# Patient Record
Sex: MALE | Race: WHITE | NOT HISPANIC OR LATINO | Employment: FULL TIME | URBAN - METROPOLITAN AREA
[De-identification: names, ages, dates, MRNs, and addresses within clinical notes are randomized per-mention and may not be internally consistent; named-entity substitution may affect disease eponyms.]

---

## 2017-02-14 ENCOUNTER — HISTORICAL (OUTPATIENT)
Dept: SURGERY | Facility: HOSPITAL | Age: 52
End: 2017-02-14

## 2018-09-10 ENCOUNTER — HISTORICAL (OUTPATIENT)
Dept: ADMINISTRATIVE | Facility: HOSPITAL | Age: 53
End: 2018-09-10

## 2018-11-21 ENCOUNTER — HISTORICAL (OUTPATIENT)
Dept: LAB | Facility: HOSPITAL | Age: 53
End: 2018-11-21

## 2018-11-21 ENCOUNTER — HISTORICAL (OUTPATIENT)
Dept: PREADMISSION TESTING | Facility: HOSPITAL | Age: 53
End: 2018-11-21

## 2018-11-21 LAB
BUN SERPL-MCNC: 15 MG/DL (ref 7–18)
CALCIUM SERPL-MCNC: 9 MG/DL (ref 8.5–10.1)
CHLORIDE SERPL-SCNC: 101 MMOL/L (ref 98–107)
CO2 SERPL-SCNC: 26 MMOL/L (ref 21–32)
CREAT SERPL-MCNC: 0.99 MG/DL (ref 0.7–1.3)
CREAT/UREA NIT SERPL: 15.2
GLUCOSE SERPL-MCNC: 84 MG/DL (ref 74–106)
POTASSIUM SERPL-SCNC: 4.2 MMOL/L (ref 3.5–5.1)
SODIUM SERPL-SCNC: 138 MMOL/L (ref 136–145)

## 2018-11-27 ENCOUNTER — HISTORICAL (OUTPATIENT)
Dept: SURGERY | Facility: HOSPITAL | Age: 53
End: 2018-11-27

## 2020-09-14 ENCOUNTER — HISTORICAL (OUTPATIENT)
Dept: ADMINISTRATIVE | Facility: HOSPITAL | Age: 55
End: 2020-09-14

## 2020-11-30 LAB
BUN SERPL-MCNC: 12.1 MG/DL (ref 8.4–25.7)
CALCIUM SERPL-MCNC: 9.5 MG/DL (ref 8.4–10.2)
CHLORIDE SERPL-SCNC: 102 MMOL/L (ref 98–107)
CO2 SERPL-SCNC: 24 MMOL/L (ref 22–29)
CREAT SERPL-MCNC: 1.21 MG/DL (ref 0.72–1.25)
CREAT/UREA NIT SERPL: 10
GLUCOSE SERPL-MCNC: 115 MG/DL (ref 74–100)
POTASSIUM SERPL-SCNC: 4.1 MMOL/L (ref 3.5–5.1)
SODIUM SERPL-SCNC: 135 MMOL/L (ref 136–145)

## 2020-12-03 ENCOUNTER — HISTORICAL (OUTPATIENT)
Dept: SURGERY | Facility: HOSPITAL | Age: 55
End: 2020-12-03

## 2022-04-10 ENCOUNTER — HISTORICAL (OUTPATIENT)
Dept: ADMINISTRATIVE | Facility: HOSPITAL | Age: 57
End: 2022-04-10

## 2022-04-29 VITALS
SYSTOLIC BLOOD PRESSURE: 133 MMHG | WEIGHT: 250.44 LBS | HEIGHT: 68 IN | DIASTOLIC BLOOD PRESSURE: 81 MMHG | BODY MASS INDEX: 37.96 KG/M2

## 2022-05-04 NOTE — HISTORICAL [facility]
This is a historical note converted from Brian. Formatting and pictures may have been removed.  Please reference Brian for original formatting and attached multimedia. Chief Complaint  Pt c/o right shoulder pain, possible RTC tear. Pt reports he may have injured it pulling nails while changing out a floor 2 months ago. No prior sx reported.  History of Present Illness  He is a pleasant 55-year-old whose had right shoulder pain?since July 2020. ?He was pulling out nails from the floor?and injured the shoulder. ?He said pain?on the lateral side of the shoulder since then. ?He notes it worse with lifting the arm. ?He is noticed associated weakness of the arm.? He has tried anti-inflammatory medications and a home directed exercise program?without relief.? He denies any numbness or tingling  Review of Systems  Comprehensive review of system?was performed with no exceptions other than noted in the history of present illness  Physical Exam  Vitals & Measurements  T:?36.6? ?C (Oral)? HR:?68(Peripheral)? BP:?138/90?  HT:?172.00?cm? WT:?112.700?kg? BMI:?38.09?  Gen: WN, WD, NAD  Card/Res: NL breathing, +distal pulses  Abdomen: ND  Shoulder Exam:??????????Right??????????Left  Skin:??????????????????????????????Normal???????Normal  AC joint tenderness:??????????None??????????None  Forward Flexion:?????????????140 ??????????180  Abduction:?????????????????????100??????????? 180  External Rotation: ????????????? 80??????????????80  Internal Rotation?????????????? 80 ???????????? 80  Supraspinatus stress test???????+ ? ? ? ? ?neg  Taylor Impingement:?? ?????+ ?????????? ?Neg  Neer Impingement:?????????????+??????????Neg  Apprehension:???????????????????? Neg??????????Neg  OBriens:????????????????????????????+????????? Neg  Speeds test:??????????????????????? Neg??????????Neg  Strength:  External Rotation:???????????????5/5???????????????5/5  Lift Off/belly press:????????????5/5???????????????5/5  ?   N-V  status:?????????????????????????Intact??????????Intact  ?   C-spine: Normal ROM, NT  ?  ?  Assessment/Plan  1.?Rotator cuff tear?M75.100  ?MRI to evaluate his rotator cuff. ?I will see him back on Friday to review  Ordered:  Clinic Follow up, *Est. 09/21/20 3:00:00 CDT, Order for future visit, Rotator cuff tear, [facility]  MRI Ext Upper Joint Right W/O Contrast, Routine, 09/14/20 8:33:00 CDT, Other (please specify), Shoulder trauma, rotator cuff tear suspected, neg xray, None, Stretcher, Patient Has IV?, Rad Type, Order for future visit, Rotator cuff tear, Schedule this test, Outside Facility, 09/14/20 8:33:0...  Office/Outpatient Visit Level 3 Established [id number] , Rotator cuff tear, [facility], 09/14/20 8:33:00 CDT  ?  Orders:  XR Shoulder Right Minimum 2 Views, Routine, 09/14/20 8:14:00 CDT, None, Stretcher, Patient Has IV?, Rad Type, Right shoulder pain, Not Scheduled, 09/14/20 8:14:00 CDT  Referrals  Clinic Follow up, *Est. 09/21/20 3:00:00 CDT, Order for future visit, Rotator cuff tear, [facility]   Problem List/Past Medical History  Ongoing  Borderline hypertension  Lumbar disc herniation  Obesity  Historical  No qualifying data  Procedure/Surgical History  Arthroscopy Knee W/Menisectomy (Right) (11/27/2018)  Arthroscopy, knee, surgical; with meniscectomy (medial AND lateral, including any meniscal shaving) including debridement/shaving of articular cartilage (chondroplasty), same or separate compartment(s), when performed (11/27/2018)  Excision of Right Knee Joint, Percutaneous Endoscopic Approach (11/27/2018)  Excision of Right Knee Joint, Percutaneous Endoscopic Approach (11/27/2018)  Fluoroscopy of Lumbar Spine using Low Osmolar Contrast (02/14/2017)  Injection Lumbar Epidural Steroid (None) (02/14/2017)  Injection(s), of diagnostic or therapeutic substance(s) (eg, anesthetic, antispasmodic, opioid, steroid, other solution), not including neurolytic substances, including needle or  catheter placement, interlaminar epidural or subarac. (02/14/2017)  Introduction of Anti-inflammatory into Spinal Canal, Percutaneous Approach (02/14/2017)  Injection Lumbar Epidural Steroid (None) (12/27/2016)  Injection(s), of diagnostic or therapeutic substance(s) (including anesthetic, antispasmodic, opioid, steroid, other solution), not including neurolytic substances, including needle or catheter placement, includes contrast for localization when performed, (12/27/2016)  Introduction of Anti-inflammatory into Epidural Space, Percutaneous Approach (12/27/2016)  Injection Lumbar Epidural Steroid (None) (11/22/2016)  Injection(s), of diagnostic or therapeutic substance(s) (including anesthetic, antispasmodic, opioid, steroid, other solution), not including neurolytic substances, including needle or catheter placement, includes contrast for localization when performed, (11/22/2016)  Introduction of Local Anesthetic into Epidural Space, Percutaneous Approach (11/22/2016)  History of elbow surgery (05/01/2012)   Medications  DICLOFENAC SODIUM 1% GEL, 2 gm, TOP, BID  LANSOPRAZOLE DR 30 MG CAPSULE, 30 mg= 1 cap(s), Oral, Daily  lisinopril 10 mg oral tablet, 10 mg= 1 tab(s), Oral, Daily  LISINOPRIL-HCTZ 10-12.5 MG TAB, 1 tab(s), Oral, Daily  SIMVASTATIN 40 MG TABLET, 40 mg= 1 tab(s), Oral, qPM  TESTOSTERONE  MG/ML, 200 mg, IM, q2wk  TRAZODONE 100 MG TABLET, 100 mg, Oral, qPM  ZyrTEC Dissolve 10 mg oral tablet, dispersible, 10 mg= 1 tab(s), Oral, Daily, PRN  Allergies  Penicillin?(?? aS A CHILD)  Social History  Abuse/Neglect  No, No, Yes, 09/14/2020  Alcohol  Current, Beer, 1-2 times per week, 11/14/2016  Substance Use  Never, 12/22/2016  Tobacco  Never (less than 100 in lifetime), N/A, 09/14/2020  Family History  Diabetes mellitus type 2: Father.  Primary malignant neoplasm of lung: Father.  Stroke: Mother.  Health Maintenance  Health Maintenance  ???Pending?(in the next year)  ??? ??OverDue  ??? ? ? ?Diabetes  Screening due??11/21/19??and every 1??year(s)  ??? ? ? ?Blood Pressure Screening due??12/05/19??and every 1??year(s)  ??? ? ? ?Body Mass Index Check due??12/05/19??and every 1??year(s)  ??? ? ? ?Depression Screening due??12/05/19??and every 1??year(s)  ??? ? ? ?Alcohol Misuse Screening due??01/02/20??and every 1??year(s)  ??? ??Due?  ??? ? ? ?ADL Screening due??09/14/20??and every 1??year(s)  ??? ? ? ?Aspirin Therapy for CVD Prevention due??09/14/20??and every 1??year(s)  ??? ? ? ?Colorectal Screening due??09/14/20??and every?  ??? ? ? ?Influenza Vaccine due??09/14/20??and every?  ??? ? ? ?Tetanus Vaccine due??09/14/20??and every 10??year(s)  ??? ? ? ?Zoster Vaccine due??09/14/20??and every?  ??? ??Due In Future?  ??? ? ? ?Obesity Screening not due until??01/01/21??and every 1??year(s)  ???Satisfied?(in the past 1 year)  ??? ??Satisfied?  ??? ? ? ?Blood Pressure Screening on??09/14/20.??Satisfied by Eva Siegel  ??? ? ? ?Body Mass Index Check on??09/14/20.??Satisfied by Eva Siegel  ??? ? ? ?Hypertension Management-Blood Pressure on??09/14/20.??Satisfied by Eva Siegel  ??? ? ? ?Obesity Screening on??09/14/20.??Satisfied by Eva Siegel  ?  Diagnostic Results  Shoulder radiographs show no arthritis

## 2022-05-04 NOTE — HISTORICAL [facility]
This is a historical note converted from Brian. Formatting and pictures may have been removed.  Please reference Brian for original formatting and attached multimedia. Chief Complaint  right knee pain x 3 weeks-no injury or previous treatment  History of Present Illness  He is a pleasant 53-year-old male who is had?right knee pain since August 2018. ?The pain is located on the inside part of the knee. ?He thinks he may have injured it while stretching the knee when it was?bent. ?The pain is worse with side to side movement especially while driving. ?It is located on the inside part. ?He is taken some anti-inflammatory medicines?with some temporary relief. ?His brother-in-law is a physical therapist and is on some exercises without relief.??He has an occasional catch within the knee itself.? He?has had a history of injections in his lumbar spine in the past with?some adverse?effects.? He denies any numbness or tingling  Review of Systems  Comprehensive review of system?was performed with no exceptions other than noted in the history of present illness  Physical Exam  Vitals & Measurements  BP:?118/82?  HT:?174?cm? HT:?174?cm? WT:?107.34?kg? WT:?107.34?kg? BMI:?35.45?  Gen: WN, WD, NAD  Card/Res: NL breathing, +distal pulses  Abdomen: ND  Standing exam  stance: normal alignment, no significant leg-length discrepancy  gait:?Mild antalgic limp  ?   Knee examination  - General comments: unremarkable appearance  ?   - Tenderness: Medial joint line  ?   Knee??????????RIGHT??????????LEFT  Skin: ??????????Intact ??????????Intact  ROM:??????????0-130??????????0-130  Effusion:??????+????????????? Neg  MJL TTP:??????+????????????? Neg  LJL TTP: ?????? Neg ???????????? Neg  Yeimi:??? +????????????? Neg  Pat crep:?????? Neg ???????????????Neg  Patella TTPs: Neg ???????????????Neg  Patella grind: Neg??????????? ?Neg  Lachman: ?????Neg ????????????????????Neg  Pivot shift: ?????Neg ???????????? Neg  Valgus stress: Neg  ???????????????Neg  Varus stress: Neg ???????????????Neg  Posterior drawer: Neg ??????????Neg  ?   N-V ????????????????????intact??????????intact  Hip:?????????????????????????nml?????????? nml  ?   Lower extremity edema:Negative  ?  Assessment/Plan  1.?Knee internal derangement  ? We will get MRI to evaluate his medial meniscus. ?I suspect he has a tear here. ?I will see him back in a week set up a treatment plan  Ordered:  Clinic Follow up, *Est. 09/17/18 3:00:00 CDT, Order for future visit, Knee internal derangement, [facility]  MRI Ext Lower Joint Right W/O Contrast, Routine, 09/10/18 10:55:00 CDT, Injury, knee & below, None, Stretcher, Patient Has IV?, Rad Type, Order for future visit, Knee internal derangement, [facility], 09/10/18 10:55:00 CDT  Office/Outpatient Visit Level 3 New [id number] PC, Knee internal derangement, [facility], 09/10/18 10:55:00 CDT  ?  Orders:  XR Knee Right 3 Views, Routine, 09/10/18 10:30:00 CDT, Pain, None, Stretcher, Patient Has IV?, Rad Type, Right knee pain, Not Scheduled, 09/10/18 10:30:00 CDT   Problem List/Past Medical History  Ongoing  Borderline hypertension  Knee internal derangement  Lumbar disc herniation  Obesity  Historical  No qualifying data  Procedure/Surgical History  Fluoroscopy of Lumbar Spine using Low Osmolar Contrast (02/14/2017)  Injection Lumbar Epidural Steroid (None) (02/14/2017)  Injection(s), of diagnostic or therapeutic substance(s) (eg, anesthetic, antispasmodic, opioid, steroid, other solution), not including neurolytic substances, including needle or catheter placement, interlaminar epidural or subarac 19-DEC-2017 11:22:23<$> (02/14/2017)  Introduction of Anti-inflammatory into Spinal Canal, Percutaneous Approach (02/14/2017)  Injection Lumbar Epidural Steroid (None) (12/27/2016)  Injection(s), of diagnostic or therapeutic substance(s) (including anesthetic, antispasmodic, opioid, steroid, other solution), not including  neurolytic substances, including needle or catheter placement, includes contrast for localization when performed, (12/27/2016)  Introduction of Anti-inflammatory into Epidural Space, Percutaneous Approach (12/27/2016)  Injection Lumbar Epidural Steroid (None) (11/22/2016)  Injection(s), of diagnostic or therapeutic substance(s) (including anesthetic, antispasmodic, opioid, steroid, other solution), not including neurolytic substances, including needle or catheter placement, includes contrast for localization when performed, (11/22/2016)  Introduction of Local Anesthetic into Epidural Space, Percutaneous Approach (11/22/2016)  History of elbow surgery (05/01/2012)   Medications  lisinopril 10 mg oral tablet, 10 mg= 1 tab(s), Oral, Daily  omeprazole, Oral, Daily  simvastatin, 1 tab, Oral, Daily  ZyrTEC Dissolve 10 mg oral tablet, dispersible, 10 mg= 1 tab(s), Oral, Daily, PRN  Allergies  Penicillin?(?? aS A CHILD)  Social History  Alcohol  Current, Beer, 1-2 times per week, 11/14/2016  Substance Abuse  Never, 12/22/2016  Tobacco  Never smoker, 11/14/2016  Family History  Diabetes mellitus type 2: Father.  Primary malignant neoplasm of lung: Father.  Stroke: Mother.  Health Maintenance  Health Maintenance  ???Pending?(in the next year)  ??? ??Due?  ??? ? ? ?ADL Screening due??09/10/18??and every 1??year(s)  ??? ? ? ?Alcohol Misuse Screening due??09/10/18??and every 1??year(s)  ??? ? ? ?Aspirin Therapy for CVD Prevention due??09/10/18??and every 1??year(s)  ??? ? ? ?Tetanus Vaccine due??09/10/18??and every 10??year(s)  ???Satisfied?(in the past 1 year)  ??? ??Satisfied?  ??? ? ? ?Blood Pressure Screening on??09/10/18.??Satisfied by Zoila Orellana  ??? ? ? ?Body Mass Index Check on??09/10/18.??Satisfied by Zoila Orellana  ??? ? ? ?Depression Screening on??09/10/18.??Satisfied by Zoila Orellana  ??? ? ? ?Obesity Screening on??09/10/18.??Satisfied by Zoila Orellana  ?  ?  Diagnostic Results  Knee radiographs  9/10/2018: 3 views of the knee show no arthritis

## 2023-09-11 ENCOUNTER — HOSPITAL ENCOUNTER (OUTPATIENT)
Dept: RADIOLOGY | Facility: CLINIC | Age: 58
Discharge: HOME | End: 2023-09-11
Attending: NURSE PRACTITIONER
Payer: COMMERCIAL

## 2023-09-11 ENCOUNTER — OFFICE VISIT (OUTPATIENT)
Dept: ORTHOPEDICS | Facility: CLINIC | Age: 58
End: 2023-09-11
Payer: COMMERCIAL

## 2023-09-11 VITALS
WEIGHT: 250 LBS | DIASTOLIC BLOOD PRESSURE: 86 MMHG | BODY MASS INDEX: 37.89 KG/M2 | SYSTOLIC BLOOD PRESSURE: 130 MMHG | HEART RATE: 69 BPM | HEIGHT: 68 IN

## 2023-09-11 DIAGNOSIS — M25.812 IMPINGEMENT OF LEFT SHOULDER: Primary | ICD-10-CM

## 2023-09-11 DIAGNOSIS — M25.512 LEFT SHOULDER PAIN, UNSPECIFIED CHRONICITY: ICD-10-CM

## 2023-09-11 PROCEDURE — 4010F ACE/ARB THERAPY RXD/TAKEN: CPT | Mod: CPTII,,, | Performed by: NURSE PRACTITIONER

## 2023-09-11 PROCEDURE — 3079F DIAST BP 80-89 MM HG: CPT | Mod: CPTII,,, | Performed by: NURSE PRACTITIONER

## 2023-09-11 PROCEDURE — 99213 PR OFFICE/OUTPT VISIT, EST, LEVL III, 20-29 MIN: ICD-10-PCS | Mod: 25,,, | Performed by: NURSE PRACTITIONER

## 2023-09-11 PROCEDURE — 1159F MED LIST DOCD IN RCRD: CPT | Mod: CPTII,,, | Performed by: NURSE PRACTITIONER

## 2023-09-11 PROCEDURE — 73030 XR SHOULDER COMPLETE 2 OR MORE VIEWS LEFT: ICD-10-PCS | Mod: LT,,, | Performed by: NURSE PRACTITIONER

## 2023-09-11 PROCEDURE — 1159F PR MEDICATION LIST DOCUMENTED IN MEDICAL RECORD: ICD-10-PCS | Mod: CPTII,,, | Performed by: NURSE PRACTITIONER

## 2023-09-11 PROCEDURE — 3008F PR BODY MASS INDEX (BMI) DOCUMENTED: ICD-10-PCS | Mod: CPTII,,, | Performed by: NURSE PRACTITIONER

## 2023-09-11 PROCEDURE — 3075F PR MOST RECENT SYSTOLIC BLOOD PRESS GE 130-139MM HG: ICD-10-PCS | Mod: CPTII,,, | Performed by: NURSE PRACTITIONER

## 2023-09-11 PROCEDURE — 20610 LARGE JOINT ASPIRATION/INJECTION: L SUBACROMIAL BURSA: ICD-10-PCS | Mod: LT,,, | Performed by: NURSE PRACTITIONER

## 2023-09-11 PROCEDURE — 3075F SYST BP GE 130 - 139MM HG: CPT | Mod: CPTII,,, | Performed by: NURSE PRACTITIONER

## 2023-09-11 PROCEDURE — 20610 DRAIN/INJ JOINT/BURSA W/O US: CPT | Mod: LT,,, | Performed by: NURSE PRACTITIONER

## 2023-09-11 PROCEDURE — 3008F BODY MASS INDEX DOCD: CPT | Mod: CPTII,,, | Performed by: NURSE PRACTITIONER

## 2023-09-11 PROCEDURE — 73030 X-RAY EXAM OF SHOULDER: CPT | Mod: LT,,, | Performed by: NURSE PRACTITIONER

## 2023-09-11 PROCEDURE — 3079F PR MOST RECENT DIASTOLIC BLOOD PRESSURE 80-89 MM HG: ICD-10-PCS | Mod: CPTII,,, | Performed by: NURSE PRACTITIONER

## 2023-09-11 PROCEDURE — 4010F PR ACE/ARB THEARPY RXD/TAKEN: ICD-10-PCS | Mod: CPTII,,, | Performed by: NURSE PRACTITIONER

## 2023-09-11 PROCEDURE — 99213 OFFICE O/P EST LOW 20 MIN: CPT | Mod: 25,,, | Performed by: NURSE PRACTITIONER

## 2023-09-11 RX ORDER — TRAZODONE HYDROCHLORIDE 100 MG/1
150 TABLET ORAL NIGHTLY
COMMUNITY
Start: 2023-08-27

## 2023-09-11 RX ORDER — TESTOSTERONE CYPIONATE 200 MG/ML
200 INJECTION, SOLUTION INTRAMUSCULAR
COMMUNITY
Start: 2023-09-02

## 2023-09-11 RX ORDER — TAMSULOSIN HYDROCHLORIDE 0.4 MG/1
1 CAPSULE ORAL DAILY
COMMUNITY
Start: 2023-08-27

## 2023-09-11 RX ORDER — BETAMETHASONE SODIUM PHOSPHATE AND BETAMETHASONE ACETATE 3; 3 MG/ML; MG/ML
6 INJECTION, SUSPENSION INTRA-ARTICULAR; INTRALESIONAL; INTRAMUSCULAR; SOFT TISSUE
Status: DISCONTINUED | OUTPATIENT
Start: 2023-09-11 | End: 2023-09-11 | Stop reason: HOSPADM

## 2023-09-11 RX ORDER — SIMVASTATIN 40 MG/1
40 TABLET, FILM COATED ORAL NIGHTLY
COMMUNITY
Start: 2023-07-10

## 2023-09-11 RX ORDER — LISINOPRIL AND HYDROCHLOROTHIAZIDE 10; 12.5 MG/1; MG/1
1 TABLET ORAL DAILY
COMMUNITY
Start: 2023-08-27

## 2023-09-11 RX ORDER — MONTELUKAST SODIUM 10 MG/1
10 TABLET ORAL DAILY
COMMUNITY
Start: 2023-08-27

## 2023-09-11 RX ORDER — LANSOPRAZOLE 30 MG/1
30 CAPSULE, DELAYED RELEASE ORAL DAILY
COMMUNITY
Start: 2023-07-10

## 2023-09-11 RX ORDER — LIDOCAINE HYDROCHLORIDE 10 MG/ML
3 INJECTION INFILTRATION; PERINEURAL
Status: DISCONTINUED | OUTPATIENT
Start: 2023-09-11 | End: 2023-09-11 | Stop reason: HOSPADM

## 2023-09-11 RX ADMIN — BETAMETHASONE SODIUM PHOSPHATE AND BETAMETHASONE ACETATE 6 MG: 3; 3 INJECTION, SUSPENSION INTRA-ARTICULAR; INTRALESIONAL; INTRAMUSCULAR; SOFT TISSUE at 09:09

## 2023-09-11 RX ADMIN — LIDOCAINE HYDROCHLORIDE 3 ML: 10 INJECTION INFILTRATION; PERINEURAL at 09:09

## 2023-09-11 NOTE — PROGRESS NOTES
Chief Complaint:   Chief Complaint   Patient presents with    Shoulder Pain     left shoulder pain for about 6mo. XR done today. states no injury. states pain is made worse by listing his arm a bit. states uses ice sometimes.       Consulting Physician: No ref. provider found    History of present illness:    he  is a pleasant 58 y.o. year old male  with left shoulder pain for the past 6 months since around spring 2023.  He denies specific injury.  He is status post right shoulder arthroscopic rotator cuff debridement, biceps tenodesis in 2020.  He feels like he is still compensating with his left shoulder.  His pain is superior, lateral, and anterior in the shoulder.  It is worse with increased activity and external rotation.  It is somewhat better with rest.  He has tried activity modification, a home directed exercise program, and over the counter medications with minimal relief.    History reviewed. No pertinent past medical history.    Past Surgical History:   Procedure Laterality Date    MENISCECTOMY Right     SHOULDER ARTHROSCOPY Right        Current Outpatient Medications   Medication Sig    lansoprazole (PREVACID) 30 MG capsule Take 30 mg by mouth.    lisinopriL-hydrochlorothiazide (PRINZIDE,ZESTORETIC) 10-12.5 mg per tablet Take 1 tablet by mouth.    montelukast (SINGULAIR) 10 mg tablet Take 10 mg by mouth.    simvastatin (ZOCOR) 40 MG tablet Take 40 mg by mouth every evening.    tamsulosin (FLOMAX) 0.4 mg Cap Take 1 capsule by mouth.    testosterone cypionate (DEPOTESTOTERONE CYPIONATE) 200 mg/mL injection Inject 200 mg into the muscle every 7 days.    traZODone (DESYREL) 100 MG tablet Take 150 mg by mouth every evening.     No current facility-administered medications for this visit.       Review of patient's allergies indicates:   Allergen Reactions    Penicillins Hives       Family History   Problem Relation Age of Onset    No Known Problems Mother     No Known Problems Father        Social History  "    Socioeconomic History    Marital status:    Tobacco Use    Smoking status: Never    Smokeless tobacco: Never   Substance and Sexual Activity    Alcohol use: Yes    Drug use: Never       Review of Systems:    Constitution:   Denies chills, fever, and sweats.  HENT:   Denies headaches or blurry vision.  Cardiovascular:  Denies chest pain or irregular heart beat.  Respiratory:   Denies cough or shortness of breath.  Gastrointestinal:  Denies abdominal pain, nausea, or vomiting.  Musculoskeletal:   Denies muscle cramps.  Neurological:   Denies dizziness or focal weakness.  Psychiatric/Behavior: Normal mental status.  Hematology/Lymph:  Denies bleeding problem or easy bruising/bleeding.  Skin:    Denies rash or suspicious lesions.    Examination:    Vital Signs:    Vitals:    09/11/23 0922   BP: 130/86   Pulse: 69   Weight: 113.4 kg (250 lb)   Height: 5' 8" (1.727 m)       Body mass index is 38.01 kg/m².    Constitution:   Well-developed, well nourished patient in no acute distress.  Neurological:   Alert and oriented x 3 and cooperative to examination.     Psychiatric/Behavior: Normal mental status.  Respiratory:   No shortness of breath.  Cards:    Pulses palpable and symmetric, brisk cap refill   Eyes:    Extraoccular muscles intact  Skin:    No scars, rash or suspicious lesions.    MSK:   Shoulder Exam:                   Right        Left  Skin:                                   Normal     Normal  AC joint tenderness:           None         None  Forward Flexion:                180            180  Abduction:                          180           180  External Rotation:               80              80  Internal Rotation:                80             80  Supraspinatus stress test: Neg           Neg  Hawkin's Impingement:     Neg            +  Neer Impingement:            Neg            +  Apprehension:                   Neg           Neg  Forsyth's:                           Neg            +  Speed's " test:                     Neg            Neg  Strength:  External Rotation:           5/5                5/5  Lift Off/belly press:          5/5                5/5    N-V status:                   Intact             Intact    C-spine: Normal ROM, NT      Imaging: X-rays ordered and images interpreted today personally by me of four views of the left shoulder show normal bony alignment       Assessment: Impingement of left shoulder  -     X-Ray Shoulder 2 or More Views Left; Future; Expected date: 09/11/2023    Other orders  -     Large Joint Aspiration/Injection: L subacromial bursa        Plan:  We discussed treatment options.  We will try a steroid injection today.  If his pain persists or worsens we can consider an MRI.  He can follow up if he has any issues

## 2023-09-11 NOTE — PROCEDURES
Large Joint Aspiration/Injection: L subacromial bursa    Date/Time: 9/11/2023 9:30 AM    Performed by: Sofia Hoyt FNP  Authorized by: Sofia Hoyt FNP    Consent Done?:  Yes (Verbal)  Indications:  Pain  Site marked: the procedure site was marked    Timeout: prior to procedure the correct patient, procedure, and site was verified    Prep: patient was prepped and draped in usual sterile fashion      Local anesthesia used?: Yes    Local anesthetic:  Topical anesthetic    Details:  Needle Size:  22 G  Approach:  Posterior  Location:  Shoulder  Site:  L subacromial bursa  Medications:  3 mL LIDOcaine HCL 10 mg/ml (1%) 10 mg/mL (1 %); 6 mg betamethasone acetate-betamethasone sodium phosphate 6 mg/mL  Patient tolerance:  Patient tolerated the procedure well with no immediate complications

## 2023-12-28 DIAGNOSIS — M25.512 ACUTE PAIN OF LEFT SHOULDER: ICD-10-CM

## 2023-12-28 DIAGNOSIS — M25.812 IMPINGEMENT OF LEFT SHOULDER: Primary | ICD-10-CM

## 2024-01-05 ENCOUNTER — OFFICE VISIT (OUTPATIENT)
Dept: ORTHOPEDICS | Facility: CLINIC | Age: 59
End: 2024-01-05
Payer: COMMERCIAL

## 2024-01-05 VITALS
DIASTOLIC BLOOD PRESSURE: 83 MMHG | HEART RATE: 85 BPM | SYSTOLIC BLOOD PRESSURE: 145 MMHG | WEIGHT: 253 LBS | BODY MASS INDEX: 38.34 KG/M2 | HEIGHT: 68 IN

## 2024-01-05 DIAGNOSIS — M75.122 NONTRAUMATIC COMPLETE TEAR OF LEFT ROTATOR CUFF: Primary | ICD-10-CM

## 2024-01-05 PROCEDURE — 3077F SYST BP >= 140 MM HG: CPT | Mod: CPTII,,, | Performed by: ORTHOPAEDIC SURGERY

## 2024-01-05 PROCEDURE — 1159F MED LIST DOCD IN RCRD: CPT | Mod: CPTII,,, | Performed by: ORTHOPAEDIC SURGERY

## 2024-01-05 PROCEDURE — 3079F DIAST BP 80-89 MM HG: CPT | Mod: CPTII,,, | Performed by: ORTHOPAEDIC SURGERY

## 2024-01-05 PROCEDURE — 3008F BODY MASS INDEX DOCD: CPT | Mod: CPTII,,, | Performed by: ORTHOPAEDIC SURGERY

## 2024-01-05 PROCEDURE — 99214 OFFICE O/P EST MOD 30 MIN: CPT | Mod: ,,, | Performed by: ORTHOPAEDIC SURGERY

## 2024-01-05 RX ORDER — SODIUM CHLORIDE 9 MG/ML
INJECTION, SOLUTION INTRAVENOUS CONTINUOUS
OUTPATIENT
Start: 2024-01-05

## 2024-01-05 NOTE — PROGRESS NOTES
Chief Complaint:   Chief Complaint   Patient presents with    Results     Left shoulder MRI results. States the shoulder is feeling about the same and the pain is still staying about the same.        Consulting Physician: No ref. provider found    History of present illness:    he  is a pleasant 58 y.o. year old male  with left shoulder pain for the past 6 months since around spring 2023.  He denies specific injury.  He is status post right shoulder arthroscopic rotator cuff debridement, biceps tenodesis in 2020.  He feels like he is still compensating with his left shoulder.  His pain is superior, lateral, and anterior in the shoulder.  It is worse with increased activity and external rotation.  It is somewhat better with rest.  He has tried activity modification, a home directed exercise program, and over the counter medications with minimal relief.    He returns today status post MRI.  We tried an injection without relief.    History reviewed. No pertinent past medical history.    Past Surgical History:   Procedure Laterality Date    MENISCECTOMY Right     SHOULDER ARTHROSCOPY Right     TONSILLECTOMY  when I was a kid       Current Outpatient Medications   Medication Sig    lansoprazole (PREVACID) 30 MG capsule Take 30 mg by mouth.    lisinopriL-hydrochlorothiazide (PRINZIDE,ZESTORETIC) 10-12.5 mg per tablet Take 1 tablet by mouth.    montelukast (SINGULAIR) 10 mg tablet Take 10 mg by mouth.    simvastatin (ZOCOR) 40 MG tablet Take 40 mg by mouth every evening.    tamsulosin (FLOMAX) 0.4 mg Cap Take 1 capsule by mouth.    testosterone cypionate (DEPOTESTOTERONE CYPIONATE) 200 mg/mL injection Inject 200 mg into the muscle every 7 days.    traZODone (DESYREL) 100 MG tablet Take 150 mg by mouth every evening.     No current facility-administered medications for this visit.       Review of patient's allergies indicates:   Allergen Reactions    Penicillins Hives and Other (See Comments)       Family History   Problem  "Relation Age of Onset    Stroke Mother     Cancer Father     COPD Father     Diabetes Father        Social History     Socioeconomic History    Marital status:    Tobacco Use    Smoking status: Never    Smokeless tobacco: Never   Substance and Sexual Activity    Alcohol use: Yes     Alcohol/week: 12.0 standard drinks of alcohol     Types: 12 Cans of beer per week    Drug use: Never    Sexual activity: Yes     Partners: Female     Birth control/protection: None       Review of Systems:    Constitution:   Denies chills, fever, and sweats.  HENT:   Denies headaches or blurry vision.  Cardiovascular:  Denies chest pain or irregular heart beat.  Respiratory:   Denies cough or shortness of breath.  Gastrointestinal:  Denies abdominal pain, nausea, or vomiting.  Musculoskeletal:   Denies muscle cramps.  Neurological:   Denies dizziness or focal weakness.  Psychiatric/Behavior: Normal mental status.  Hematology/Lymph:  Denies bleeding problem or easy bruising/bleeding.  Skin:    Denies rash or suspicious lesions.    Examination:    Vital Signs:    Vitals:    01/05/24 0900   BP: (!) 145/83   Pulse: 85   Weight: 114.8 kg (253 lb)   Height: 5' 8" (1.727 m)       Body mass index is 38.47 kg/m².    Constitution:   Well-developed, well nourished patient in no acute distress.  Neurological:   Alert and oriented x 3 and cooperative to examination.     Psychiatric/Behavior: Normal mental status.  Respiratory:   No shortness of breath.  Cards:    Pulses palpable and symmetric, brisk cap refill   Eyes:    Extraoccular muscles intact  Skin:    No scars, rash or suspicious lesions.    MSK:   Shoulder Exam:                   Right        Left  Skin:                                   Normal     Normal  AC joint tenderness:           None         None  Forward Flexion:                180            150  Abduction:                          180           100  External Rotation:               80              80  Internal Rotation:      "           80             80  Supraspinatus stress test: Neg           +  Hawkin's Impingement:     Neg            +  Neer Impingement:            Neg            +  Apprehension:                   Neg           Neg  Decker's:                           Neg            +  Speed's test:                     Neg            Neg  Strength:  External Rotation:           5/5                5/5  Lift Off/belly press:          5/5                5/5    N-V status:                   Intact             Intact    C-spine: Normal ROM, NT      Imaging: X-rays ordered and images interpreted today personally by me of four views of the left shoulder show normal bony alignment       Assessment: Nontraumatic complete tear of left rotator cuff        Plan:  I have recommended surgical intervention: Left shoulder arthroscopic rotator cuff repair, possible biceps tenodesis.  We discussed the details of the procedure and expected postoperative course.  We discussed the benefits of surgery which be to decrease his pain and increase his function.  We discussed the risks of surgery which is small but could be significant if he has continued pain, stiffness, or infection.  After discussion he would like to proceed.  Plans for surgery February 22nd

## 2024-01-26 ENCOUNTER — TELEPHONE (OUTPATIENT)
Dept: ORTHOPEDICS | Facility: CLINIC | Age: 59
End: 2024-01-26
Payer: COMMERCIAL

## 2024-02-09 ENCOUNTER — CLINICAL SUPPORT (OUTPATIENT)
Dept: LAB | Facility: HOSPITAL | Age: 59
End: 2024-02-09
Attending: ORTHOPAEDIC SURGERY
Payer: COMMERCIAL

## 2024-02-09 ENCOUNTER — OFFICE VISIT (OUTPATIENT)
Dept: ORTHOPEDICS | Facility: CLINIC | Age: 59
End: 2024-02-09
Payer: COMMERCIAL

## 2024-02-09 VITALS — HEIGHT: 68 IN | BODY MASS INDEX: 38.95 KG/M2 | WEIGHT: 257 LBS

## 2024-02-09 DIAGNOSIS — Z01.818 PRE-OP TESTING: ICD-10-CM

## 2024-02-09 DIAGNOSIS — M75.22 BICEPS TENDINITIS, LEFT: ICD-10-CM

## 2024-02-09 DIAGNOSIS — M75.122 NONTRAUMATIC COMPLETE TEAR OF LEFT ROTATOR CUFF: ICD-10-CM

## 2024-02-09 DIAGNOSIS — Z01.818 PRE-OP TESTING: Primary | ICD-10-CM

## 2024-02-09 LAB
OHS QRS DURATION: 82 MS
OHS QTC CALCULATION: 414 MS

## 2024-02-09 PROCEDURE — 3008F BODY MASS INDEX DOCD: CPT | Mod: CPTII,,, | Performed by: NURSE PRACTITIONER

## 2024-02-09 PROCEDURE — 93010 ELECTROCARDIOGRAM REPORT: CPT | Mod: ,,, | Performed by: INTERNAL MEDICINE

## 2024-02-09 PROCEDURE — 1159F MED LIST DOCD IN RCRD: CPT | Mod: CPTII,,, | Performed by: NURSE PRACTITIONER

## 2024-02-09 PROCEDURE — 93005 ELECTROCARDIOGRAM TRACING: CPT

## 2024-02-09 PROCEDURE — 99213 OFFICE O/P EST LOW 20 MIN: CPT | Mod: ,,, | Performed by: NURSE PRACTITIONER

## 2024-02-09 NOTE — PROGRESS NOTES
Chief Complaint:   Chief Complaint   Patient presents with    Pre-op Exam     Pre-op for Left shoulder,        Consulting Physician: No ref. provider found    History of present illness:    he  is a pleasant 58 y.o. year old male  with left shoulder pain for the past 6 months since around spring 2023.  He denies specific injury.  He is status post right shoulder arthroscopic rotator cuff debridement, biceps tenodesis in 2020.  He feels like he is still compensating with his left shoulder.  His pain is superior, lateral, and anterior in the shoulder.  It is worse with increased activity and external rotation.  It is somewhat better with rest.  He has tried activity modification, a home directed exercise program, and over the counter medications with minimal relief.    He returns today for preop.     Past Medical History:   Diagnosis Date    Digestive disorder     Hypertension     Rotator cuff tear     Seasonal allergies        Past Surgical History:   Procedure Laterality Date    COLONOSCOPY      DEBRIDEMENT TENNIS ELBOW      KNEE ARTHROSCOPY Right     SHOULDER ARTHROSCOPY Right     TONSILLECTOMY  when I was a kid       Current Outpatient Medications   Medication Sig    lansoprazole (PREVACID) 30 MG capsule Take 30 mg by mouth once daily.    lisinopriL-hydrochlorothiazide (PRINZIDE,ZESTORETIC) 10-12.5 mg per tablet Take 1 tablet by mouth once daily.    montelukast (SINGULAIR) 10 mg tablet Take 10 mg by mouth once daily.    simvastatin (ZOCOR) 40 MG tablet Take 40 mg by mouth every evening.    tamsulosin (FLOMAX) 0.4 mg Cap Take 1 capsule by mouth once daily.    testosterone cypionate (DEPOTESTOTERONE CYPIONATE) 200 mg/mL injection Inject 200 mg into the muscle every 7 days.    traZODone (DESYREL) 100 MG tablet Take 150 mg by mouth every evening.     No current facility-administered medications for this visit.       Review of patient's allergies indicates:   Allergen Reactions    Penicillins Hives and Other (See  "Comments)       Family History   Problem Relation Age of Onset    Stroke Mother     Cancer Father     COPD Father     Diabetes Father        Social History     Socioeconomic History    Marital status:    Tobacco Use    Smoking status: Never    Smokeless tobacco: Never   Substance and Sexual Activity    Alcohol use: Yes     Alcohol/week: 12.0 standard drinks of alcohol     Types: 12 Cans of beer per week    Drug use: Never    Sexual activity: Yes     Partners: Female     Birth control/protection: None       Review of Systems:    Constitution:   Denies chills, fever, and sweats.  HENT:   Denies headaches or blurry vision.  Cardiovascular:  Denies chest pain or irregular heart beat.  Respiratory:   Denies cough or shortness of breath.  Gastrointestinal:  Denies abdominal pain, nausea, or vomiting.  Musculoskeletal:   Denies muscle cramps.  Neurological:   Denies dizziness or focal weakness.  Psychiatric/Behavior: Normal mental status.  Hematology/Lymph:  Denies bleeding problem or easy bruising/bleeding.  Skin:    Denies rash or suspicious lesions.    Examination:    Vital Signs:    Vitals:    02/09/24 0841   Weight: 116.6 kg (257 lb)   Height: 5' 8" (1.727 m)       Body mass index is 39.08 kg/m².    Constitution:   Well-developed, well nourished patient in no acute distress.  Neurological:   Alert and oriented x 3 and cooperative to examination.     Psychiatric/Behavior: Normal mental status.  Respiratory:   No shortness of breath.  Cards:    Pulses palpable and symmetric, brisk cap refill   Eyes:    Extraoccular muscles intact  Skin:    No scars, rash or suspicious lesions.    MSK:   Shoulder Exam:                   Right        Left  Skin:                                   Normal     Normal  AC joint tenderness:           None         None  Forward Flexion:                180            150  Abduction:                          180           100  External Rotation:               80              80  Internal " Rotation:                80             80  Supraspinatus stress test: Neg           +  Hawkin's Impingement:     Neg            +  Neer Impingement:            Neg            +  Apprehension:                   Neg           Neg  [place]'s:                           Neg            +  Speed's test:                     Neg            Neg  Strength:  External Rotation:           5/5                5/5  Lift Off/belly press:          5/5                5/5    N-V status:                   Intact             Intact    C-spine: Normal ROM, NT      Imaging: MRI: Left shoulder 9 mm AP x 5 mm TV interstitial tear involving the posterior 3rd supraspinatus tendon fibers at greater tuberosity insertion, involving 80% of tendon thickness.     Moderate supraspinatus tendinosis.     Small posterior glenoid labral tear.     Mild acromioclavicular joint osteoarthritis, with mild surrounding marrow edema.          Assessment: Pre-op testing  -     Basic Metabolic Panel; Future; Expected date: 02/09/2024  -     EKG 12-lead; Future    Nontraumatic complete tear of left rotator cuff        Plan:  Pt is still agreeable to surgical intervention: Left shoulder arthroscopic rotator cuff repair, possible biceps tenodesis.  We discussed the details of the procedure and expected postoperative course.  We discussed the benefits of surgery which be to decrease his pain and increase his function.  We discussed the risks of surgery which is small but could be significant if he has continued pain, stiffness, or infection.  After discussion he would like to proceed.  Plans for surgery February 23rd

## 2024-02-09 NOTE — H&P (VIEW-ONLY)
Chief Complaint:   Chief Complaint   Patient presents with    Pre-op Exam     Pre-op for Left shoulder,        Consulting Physician: No ref. provider found    History of present illness:    he  is a pleasant 58 y.o. year old male  with left shoulder pain for the past 6 months since around spring 2023.  He denies specific injury.  He is status post right shoulder arthroscopic rotator cuff debridement, biceps tenodesis in 2020.  He feels like he is still compensating with his left shoulder.  His pain is superior, lateral, and anterior in the shoulder.  It is worse with increased activity and external rotation.  It is somewhat better with rest.  He has tried activity modification, a home directed exercise program, and over the counter medications with minimal relief.    He returns today for preop.     Past Medical History:   Diagnosis Date    Digestive disorder     Hypertension     Rotator cuff tear     Seasonal allergies        Past Surgical History:   Procedure Laterality Date    COLONOSCOPY      DEBRIDEMENT TENNIS ELBOW      KNEE ARTHROSCOPY Right     SHOULDER ARTHROSCOPY Right     TONSILLECTOMY  when I was a kid       Current Outpatient Medications   Medication Sig    lansoprazole (PREVACID) 30 MG capsule Take 30 mg by mouth once daily.    lisinopriL-hydrochlorothiazide (PRINZIDE,ZESTORETIC) 10-12.5 mg per tablet Take 1 tablet by mouth once daily.    montelukast (SINGULAIR) 10 mg tablet Take 10 mg by mouth once daily.    simvastatin (ZOCOR) 40 MG tablet Take 40 mg by mouth every evening.    tamsulosin (FLOMAX) 0.4 mg Cap Take 1 capsule by mouth once daily.    testosterone cypionate (DEPOTESTOTERONE CYPIONATE) 200 mg/mL injection Inject 200 mg into the muscle every 7 days.    traZODone (DESYREL) 100 MG tablet Take 150 mg by mouth every evening.     No current facility-administered medications for this visit.       Review of patient's allergies indicates:   Allergen Reactions    Penicillins Hives and Other (See  "Comments)       Family History   Problem Relation Age of Onset    Stroke Mother     Cancer Father     COPD Father     Diabetes Father        Social History     Socioeconomic History    Marital status:    Tobacco Use    Smoking status: Never    Smokeless tobacco: Never   Substance and Sexual Activity    Alcohol use: Yes     Alcohol/week: 12.0 standard drinks of alcohol     Types: 12 Cans of beer per week    Drug use: Never    Sexual activity: Yes     Partners: Female     Birth control/protection: None       Review of Systems:    Constitution:   Denies chills, fever, and sweats.  HENT:   Denies headaches or blurry vision.  Cardiovascular:  Denies chest pain or irregular heart beat.  Respiratory:   Denies cough or shortness of breath.  Gastrointestinal:  Denies abdominal pain, nausea, or vomiting.  Musculoskeletal:   Denies muscle cramps.  Neurological:   Denies dizziness or focal weakness.  Psychiatric/Behavior: Normal mental status.  Hematology/Lymph:  Denies bleeding problem or easy bruising/bleeding.  Skin:    Denies rash or suspicious lesions.    Examination:    Vital Signs:    Vitals:    02/09/24 0841   Weight: 116.6 kg (257 lb)   Height: 5' 8" (1.727 m)       Body mass index is 39.08 kg/m².    Constitution:   Well-developed, well nourished patient in no acute distress.  Neurological:   Alert and oriented x 3 and cooperative to examination.     Psychiatric/Behavior: Normal mental status.  Respiratory:   No shortness of breath.  Cards:    Pulses palpable and symmetric, brisk cap refill   Eyes:    Extraoccular muscles intact  Skin:    No scars, rash or suspicious lesions.    MSK:   Shoulder Exam:                   Right        Left  Skin:                                   Normal     Normal  AC joint tenderness:           None         None  Forward Flexion:                180            150  Abduction:                          180           100  External Rotation:               80              80  Internal " Rotation:                80             80  Supraspinatus stress test: Neg           +  Hawkin's Impingement:     Neg            +  Neer Impingement:            Neg            +  Apprehension:                   Neg           Neg  [place]'s:                           Neg            +  Speed's test:                     Neg            Neg  Strength:  External Rotation:           5/5                5/5  Lift Off/belly press:          5/5                5/5    N-V status:                   Intact             Intact    C-spine: Normal ROM, NT      Imaging: MRI: Left shoulder 9 mm AP x 5 mm TV interstitial tear involving the posterior 3rd supraspinatus tendon fibers at greater tuberosity insertion, involving 80% of tendon thickness.     Moderate supraspinatus tendinosis.     Small posterior glenoid labral tear.     Mild acromioclavicular joint osteoarthritis, with mild surrounding marrow edema.          Assessment: Pre-op testing  -     Basic Metabolic Panel; Future; Expected date: 02/09/2024  -     EKG 12-lead; Future    Nontraumatic complete tear of left rotator cuff        Plan:  Pt is still agreeable to surgical intervention: Left shoulder arthroscopic rotator cuff repair, possible biceps tenodesis.  We discussed the details of the procedure and expected postoperative course.  We discussed the benefits of surgery which be to decrease his pain and increase his function.  We discussed the risks of surgery which is small but could be significant if he has continued pain, stiffness, or infection.  After discussion he would like to proceed.  Plans for surgery February 23rd

## 2024-02-20 ENCOUNTER — ANESTHESIA EVENT (OUTPATIENT)
Dept: SURGERY | Facility: HOSPITAL | Age: 59
End: 2024-02-20
Payer: COMMERCIAL

## 2024-02-23 ENCOUNTER — HOSPITAL ENCOUNTER (OUTPATIENT)
Facility: HOSPITAL | Age: 59
Discharge: HOME | End: 2024-02-23
Attending: ORTHOPAEDIC SURGERY | Admitting: ORTHOPAEDIC SURGERY
Payer: COMMERCIAL

## 2024-02-23 ENCOUNTER — ANESTHESIA (OUTPATIENT)
Dept: SURGERY | Facility: HOSPITAL | Age: 59
End: 2024-02-23
Payer: COMMERCIAL

## 2024-02-23 VITALS
OXYGEN SATURATION: 97 % | WEIGHT: 252 LBS | BODY MASS INDEX: 38.19 KG/M2 | TEMPERATURE: 96 F | DIASTOLIC BLOOD PRESSURE: 90 MMHG | RESPIRATION RATE: 18 BRPM | HEIGHT: 68 IN | HEART RATE: 73 BPM | SYSTOLIC BLOOD PRESSURE: 139 MMHG

## 2024-02-23 DIAGNOSIS — M75.122 NONTRAUMATIC COMPLETE TEAR OF LEFT ROTATOR CUFF: Primary | ICD-10-CM

## 2024-02-23 PROCEDURE — 71000033 HC RECOVERY, INTIAL HOUR: Performed by: ORTHOPAEDIC SURGERY

## 2024-02-23 PROCEDURE — C1713 ANCHOR/SCREW BN/BN,TIS/BN: HCPCS | Performed by: ORTHOPAEDIC SURGERY

## 2024-02-23 PROCEDURE — 29827 SHO ARTHRS SRG RT8TR CUF RPR: CPT | Mod: LT,,, | Performed by: ORTHOPAEDIC SURGERY

## 2024-02-23 PROCEDURE — 63600175 PHARM REV CODE 636 W HCPCS

## 2024-02-23 PROCEDURE — 71000016 HC POSTOP RECOV ADDL HR: Performed by: ORTHOPAEDIC SURGERY

## 2024-02-23 PROCEDURE — 63600175 PHARM REV CODE 636 W HCPCS: Performed by: NURSE PRACTITIONER

## 2024-02-23 PROCEDURE — 37000009 HC ANESTHESIA EA ADD 15 MINS: Performed by: ORTHOPAEDIC SURGERY

## 2024-02-23 PROCEDURE — 25000003 PHARM REV CODE 250: Performed by: NURSE ANESTHETIST, CERTIFIED REGISTERED

## 2024-02-23 PROCEDURE — 25000003 PHARM REV CODE 250: Performed by: NURSE PRACTITIONER

## 2024-02-23 PROCEDURE — 25000003 PHARM REV CODE 250: Performed by: ORTHOPAEDIC SURGERY

## 2024-02-23 PROCEDURE — 71000015 HC POSTOP RECOV 1ST HR: Performed by: ORTHOPAEDIC SURGERY

## 2024-02-23 PROCEDURE — 29828 SHO ARTHRS SRG BICP TENODSIS: CPT | Mod: 51,AS,LT, | Performed by: NURSE PRACTITIONER

## 2024-02-23 PROCEDURE — D9220A PRA ANESTHESIA: Mod: ANES,,, | Performed by: ANESTHESIOLOGY

## 2024-02-23 PROCEDURE — 63600175 PHARM REV CODE 636 W HCPCS: Performed by: ANESTHESIOLOGY

## 2024-02-23 PROCEDURE — 36000711: Performed by: ORTHOPAEDIC SURGERY

## 2024-02-23 PROCEDURE — 64415 NJX AA&/STRD BRCH PLXS IMG: CPT | Mod: 59,LT,, | Performed by: ANESTHESIOLOGY

## 2024-02-23 PROCEDURE — 27201423 OPTIME MED/SURG SUP & DEVICES STERILE SUPPLY: Performed by: ORTHOPAEDIC SURGERY

## 2024-02-23 PROCEDURE — 29828 SHO ARTHRS SRG BICP TENODSIS: CPT | Mod: 51,LT,, | Performed by: ORTHOPAEDIC SURGERY

## 2024-02-23 PROCEDURE — 63600175 PHARM REV CODE 636 W HCPCS: Performed by: NURSE ANESTHETIST, CERTIFIED REGISTERED

## 2024-02-23 PROCEDURE — 64415 NJX AA&/STRD BRCH PLXS IMG: CPT | Performed by: ANESTHESIOLOGY

## 2024-02-23 PROCEDURE — 29827 SHO ARTHRS SRG RT8TR CUF RPR: CPT | Mod: AS,LT,, | Performed by: NURSE PRACTITIONER

## 2024-02-23 PROCEDURE — 37000008 HC ANESTHESIA 1ST 15 MINUTES: Performed by: ORTHOPAEDIC SURGERY

## 2024-02-23 PROCEDURE — D9220A PRA ANESTHESIA: Mod: CRNA,,, | Performed by: NURSE ANESTHETIST, CERTIFIED REGISTERED

## 2024-02-23 PROCEDURE — 36000710: Performed by: ORTHOPAEDIC SURGERY

## 2024-02-23 DEVICE — BIO-COMP SWIVELOCK C, CLD 5.5X19.1MM
Type: IMPLANTABLE DEVICE | Site: SHOULDER | Status: FUNCTIONAL
Brand: ARTHREX®

## 2024-02-23 DEVICE — SWVLK TENO BIO-COMP 7X 19.5MM
Type: IMPLANTABLE DEVICE | Site: SHOULDER | Status: FUNCTIONAL
Brand: ARTHREX®

## 2024-02-23 RX ORDER — LIDOCAINE HYDROCHLORIDE 10 MG/ML
INJECTION, SOLUTION EPIDURAL; INFILTRATION; INTRACAUDAL; PERINEURAL
Status: DISCONTINUED | OUTPATIENT
Start: 2024-02-23 | End: 2024-02-23

## 2024-02-23 RX ORDER — MIDAZOLAM HYDROCHLORIDE 1 MG/ML
INJECTION INTRAMUSCULAR; INTRAVENOUS
Status: DISCONTINUED | OUTPATIENT
Start: 2024-02-23 | End: 2024-02-23

## 2024-02-23 RX ORDER — HYDROCODONE BITARTRATE AND ACETAMINOPHEN 5; 325 MG/1; MG/1
1 TABLET ORAL EVERY 4 HOURS PRN
Status: DISCONTINUED | OUTPATIENT
Start: 2024-02-23 | End: 2024-02-23 | Stop reason: HOSPADM

## 2024-02-23 RX ORDER — KETOROLAC TROMETHAMINE 10 MG/1
10 TABLET, FILM COATED ORAL 3 TIMES DAILY
Qty: 15 TABLET | Refills: 0 | Status: SHIPPED | OUTPATIENT
Start: 2024-02-23

## 2024-02-23 RX ORDER — EPINEPHRINE 1 MG/ML
INJECTION, SOLUTION, CONCENTRATE INTRAVENOUS
Status: DISCONTINUED
Start: 2024-02-23 | End: 2024-02-23 | Stop reason: HOSPADM

## 2024-02-23 RX ORDER — SODIUM CHLORIDE 0.9 % (FLUSH) 0.9 %
3 SYRINGE (ML) INJECTION EVERY 6 HOURS PRN
Status: DISCONTINUED | OUTPATIENT
Start: 2024-02-23 | End: 2024-02-23 | Stop reason: HOSPADM

## 2024-02-23 RX ORDER — MIDAZOLAM HYDROCHLORIDE 1 MG/ML
INJECTION INTRAMUSCULAR; INTRAVENOUS
Status: COMPLETED
Start: 2024-02-23 | End: 2024-02-23

## 2024-02-23 RX ORDER — ROPIVACAINE HYDROCHLORIDE 5 MG/ML
INJECTION, SOLUTION EPIDURAL; INFILTRATION; PERINEURAL
Status: COMPLETED | OUTPATIENT
Start: 2024-02-23 | End: 2024-02-23

## 2024-02-23 RX ORDER — OXYCODONE AND ACETAMINOPHEN 5; 325 MG/1; MG/1
1 TABLET ORAL EVERY 6 HOURS PRN
Qty: 20 TABLET | Refills: 0 | Status: SHIPPED | OUTPATIENT
Start: 2024-02-23

## 2024-02-23 RX ORDER — PHENYLEPHRINE HYDROCHLORIDE 10 MG/ML
INJECTION INTRAVENOUS
Status: DISCONTINUED | OUTPATIENT
Start: 2024-02-23 | End: 2024-02-23

## 2024-02-23 RX ORDER — DEXAMETHASONE SODIUM PHOSPHATE 4 MG/ML
INJECTION, SOLUTION INTRA-ARTICULAR; INTRALESIONAL; INTRAMUSCULAR; INTRAVENOUS; SOFT TISSUE
Status: DISCONTINUED | OUTPATIENT
Start: 2024-02-23 | End: 2024-02-23

## 2024-02-23 RX ORDER — TRAMADOL HYDROCHLORIDE 50 MG/1
50 TABLET ORAL EVERY 4 HOURS PRN
Status: DISCONTINUED | OUTPATIENT
Start: 2024-02-23 | End: 2024-02-23 | Stop reason: HOSPADM

## 2024-02-23 RX ORDER — ROCURONIUM BROMIDE 10 MG/ML
INJECTION, SOLUTION INTRAVENOUS
Status: DISCONTINUED | OUTPATIENT
Start: 2024-02-23 | End: 2024-02-23

## 2024-02-23 RX ORDER — PROMETHAZINE HYDROCHLORIDE 25 MG/1
25 TABLET ORAL EVERY 6 HOURS PRN
Status: DISCONTINUED | OUTPATIENT
Start: 2024-02-23 | End: 2024-02-23 | Stop reason: HOSPADM

## 2024-02-23 RX ORDER — ONDANSETRON HYDROCHLORIDE 2 MG/ML
4 INJECTION, SOLUTION INTRAVENOUS EVERY 12 HOURS PRN
Status: DISCONTINUED | OUTPATIENT
Start: 2024-02-23 | End: 2024-02-23 | Stop reason: HOSPADM

## 2024-02-23 RX ORDER — ONDANSETRON HYDROCHLORIDE 2 MG/ML
INJECTION, SOLUTION INTRAVENOUS
Status: DISCONTINUED | OUTPATIENT
Start: 2024-02-23 | End: 2024-02-23

## 2024-02-23 RX ORDER — PROPOFOL 10 MG/ML
VIAL (ML) INTRAVENOUS
Status: DISCONTINUED | OUTPATIENT
Start: 2024-02-23 | End: 2024-02-23

## 2024-02-23 RX ORDER — METHOCARBAMOL 750 MG/1
750 TABLET, FILM COATED ORAL 3 TIMES DAILY
Qty: 21 TABLET | Refills: 0 | Status: SHIPPED | OUTPATIENT
Start: 2024-02-23

## 2024-02-23 RX ORDER — EPINEPHRINE 1 MG/ML
INJECTION, SOLUTION, CONCENTRATE INTRAVENOUS
Status: DISCONTINUED
Start: 2024-02-23 | End: 2024-02-23 | Stop reason: WASHOUT

## 2024-02-23 RX ORDER — MORPHINE SULFATE 4 MG/ML
3 INJECTION, SOLUTION INTRAMUSCULAR; INTRAVENOUS
Status: DISCONTINUED | OUTPATIENT
Start: 2024-02-23 | End: 2024-02-23 | Stop reason: HOSPADM

## 2024-02-23 RX ORDER — FENTANYL CITRATE 50 UG/ML
INJECTION, SOLUTION INTRAMUSCULAR; INTRAVENOUS
Status: DISCONTINUED | OUTPATIENT
Start: 2024-02-23 | End: 2024-02-23

## 2024-02-23 RX ORDER — MUPIROCIN 20 MG/G
OINTMENT TOPICAL 2 TIMES DAILY
Status: DISCONTINUED | OUTPATIENT
Start: 2024-02-23 | End: 2024-02-23 | Stop reason: HOSPADM

## 2024-02-23 RX ORDER — GLYCOPYRROLATE 0.2 MG/ML
INJECTION INTRAMUSCULAR; INTRAVENOUS
Status: DISCONTINUED | OUTPATIENT
Start: 2024-02-23 | End: 2024-02-23

## 2024-02-23 RX ORDER — ROPIVACAINE HYDROCHLORIDE 5 MG/ML
INJECTION, SOLUTION EPIDURAL; INFILTRATION; PERINEURAL
Status: COMPLETED
Start: 2024-02-23 | End: 2024-02-23

## 2024-02-23 RX ADMIN — MIDAZOLAM HYDROCHLORIDE 2 MG: 1 INJECTION, SOLUTION INTRAMUSCULAR; INTRAVENOUS at 10:02

## 2024-02-23 RX ADMIN — PHENYLEPHRINE HYDROCHLORIDE 100 MCG: 10 INJECTION INTRAVENOUS at 12:02

## 2024-02-23 RX ADMIN — LIDOCAINE HYDROCHLORIDE 3 ML: 10 INJECTION, SOLUTION EPIDURAL; INFILTRATION; INTRACAUDAL; PERINEURAL at 12:02

## 2024-02-23 RX ADMIN — GLYCOPYRROLATE 0.15 MG: 0.2 INJECTION INTRAMUSCULAR; INTRAVENOUS at 12:02

## 2024-02-23 RX ADMIN — HYDROCODONE BITARTRATE AND ACETAMINOPHEN 1 TABLET: 5; 325 TABLET ORAL at 02:02

## 2024-02-23 RX ADMIN — ROCURONIUM BROMIDE 50 MG: 10 SOLUTION INTRAVENOUS at 12:02

## 2024-02-23 RX ADMIN — CEFAZOLIN 2 G: 2 INJECTION, POWDER, FOR SOLUTION INTRAMUSCULAR; INTRAVENOUS at 12:02

## 2024-02-23 RX ADMIN — ONDANSETRON HYDROCHLORIDE 4 MG: 2 SOLUTION INTRAMUSCULAR; INTRAVENOUS at 12:02

## 2024-02-23 RX ADMIN — ROPIVACAINE HYDROCHLORIDE 30 ML: 5 INJECTION, SOLUTION EPIDURAL; INFILTRATION; PERINEURAL at 10:02

## 2024-02-23 RX ADMIN — MIDAZOLAM 2 MG: 1 INJECTION INTRAMUSCULAR; INTRAVENOUS at 11:02

## 2024-02-23 RX ADMIN — SODIUM CHLORIDE, SODIUM GLUCONATE, SODIUM ACETATE, POTASSIUM CHLORIDE AND MAGNESIUM CHLORIDE: 526; 502; 368; 37; 30 INJECTION, SOLUTION INTRAVENOUS at 11:02

## 2024-02-23 RX ADMIN — SUGAMMADEX 200 MG: 100 INJECTION, SOLUTION INTRAVENOUS at 01:02

## 2024-02-23 RX ADMIN — FENTANYL CITRATE 100 MCG: 50 INJECTION, SOLUTION INTRAMUSCULAR; INTRAVENOUS at 12:02

## 2024-02-23 RX ADMIN — DEXAMETHASONE SODIUM PHOSPHATE 8 MG: 4 INJECTION, SOLUTION INTRA-ARTICULAR; INTRALESIONAL; INTRAMUSCULAR; INTRAVENOUS; SOFT TISSUE at 12:02

## 2024-02-23 RX ADMIN — ROCURONIUM BROMIDE 15 MG: 10 SOLUTION INTRAVENOUS at 12:02

## 2024-02-23 RX ADMIN — PROPOFOL 180 MG: 10 INJECTION, EMULSION INTRAVENOUS at 12:02

## 2024-02-23 NOTE — ANESTHESIA PROCEDURE NOTES
Peripheral Block/Supraclavicular    Patient location during procedure: pre-op   Block not for primary anesthetic.  Reason for block: at surgeon's request and post-op pain management   Post-op Pain Location: Left Shoulder   Start time: 2/23/2024 10:48 AM  Timeout: 2/23/2024 10:40 AM   End time: 2/23/2024 10:51 AM    Staffing  Authorizing Provider: Power Rebolledo MD  Performing Provider: Power Rebolledo MD    Staffing  Performed by: Power Rebolledo MD  Authorized by: Power Rebolledo MD    Preanesthetic Checklist  Completed: patient identified, IV checked, site marked, risks and benefits discussed, surgical consent, monitors and equipment checked, pre-op evaluation and timeout performed  Peripheral Block  Patient position: supine  Prep: ChloraPrep  Patient monitoring: heart rate, cardiac monitor, continuous pulse ox, continuous capnometry and frequent blood pressure checks  Block type: supraclavicular  Laterality: left  Injection technique: single shot  Needle  Needle type: Stimuplex   Needle gauge: 22 G  Needle length: 4 in  Needle localization: anatomical landmarks, ultrasound guidance and nerve stimulator   -ultrasound image captured on disc.  Assessment  Injection assessment: negative aspiration, negative parasthesia and local visualized surrounding nerve  Paresthesia pain: none  Heart rate change: no  Slow fractionated injection: yes  Pain Tolerance: comfortable throughout block and no complaints  Medications:    Medications: ropivacaine (NAROPIN) injection 0.5% - Perineural   30 mL - 2/23/2024 10:48:00 AM    Additional Notes  VSS.  [facility] RN monitoring vitals throughout procedure.  Patient tolerated procedure well.     Anatomy of Brachial plexus and needle identified utilizing Ultrasound. With needle in close  Proximity to nerves and neg.aspiration, local anesthetic injected incrementally(1+5mls) w/o difficulty  Injectate observed surrounding nerves. Image saved and downloaded.    Total volume injected: 30mls,  incrementally (1+5mls).

## 2024-02-23 NOTE — OP NOTE
DATE OF SERVICE: 02/23/2024    SURGEON: Dilan Florez MD    PREOPERATIVE DIAGNOSIS: Left shoulder rotator cuff tear, biceps tendinitis    POSTOPERATIVE DIAGNOSIS: Left shoulder rotator cuff tear, biceps tendinitis    PROCEDURE PERFORMED:   Left shoulder arthroscopic rotator cuff repair  Left shoulder arthroscopic biceps tenodesis     ASSISTANT: Sofia Hoyt NP. Mrs. Hoyt was essential in manipulating the arm I performed the procedure, suture shuttling and management, and closure    ANESTHESIA: General plus regional    ESTIMATED BLOOD LOSS: Minimal.    COMPLICATIONS: None.    IMPLANTS:    1. Lateral Row: Arthrex SwiveLock 5.5mm Anchors   2. Biceps: Arthrex 7 mm Tenodesis Screw     INDICATIONS FOR PROCEDURE: Cristóbal Mckeon Jr. is a 58 y.o. year old who has had ongoing left shoulder pain and weakness. The patient was seen in the clinic and preoperative imaging has revealed a torn rotator cuff. The patient has failed nonoperative treatment and has elected for operative intervention. Risks and benefits were thoroughly explained and consent was obtained prior to today's procedure.    DESCRIPTION OF PROCEDURE: The patient was seen in the preoperative holding area where the history and physical were reviewed without change. The operative shoulder was marked, consents were reviewed, and any questions were answered for the patient. A regional blockade of the shoulder was performed by the Anesthesia Service. The patient was induced under general anesthesia. Next the patient was placed upright into the beachchair position with care taken to ensure the cervical spine was well positioned and the face, eyes and all bony prominences well protected. Preoperative time-out led by the surgeon was performed verifying the patient, procedure, and preoperative antibiotics. The left upper extremity was then prepped and draped in the usual sterile fashion and secured to an arm rodriguez.    A standard posterior portal was established with  a #11-blade.  The arthroscope was inserted into the glenohumeral joint atraumatically. The joint was insufflated with arthroscopic fluid. We then made a standard anterior portal using an #18-gauge spinal needle for guidance. An arthroscopic probe was inserted through the anterior portal and diagnostic arthroscopy begun.    This revealed a inflamed biceps tendon. A torn superior labrum. An intact anterior, inferior and posterior labrum. The articular cartilage of the humeral head was intact. The articular cartilage of the glenoid was intact. The subscapularis tendon was intact. The articular side of the supraspinatus tendon was intact. The articular side of the infraspinatus tendon was intact.    At this point, we tagged our biceps with a spinal needle, and then used our arthroscopic punch to perform a tenotomy of the biceps tendon. We used arthroscopic shaver to debride any remnant biceps from the superior labrum. The arthroscope was then brought into the subacromial space and a standard lateral portal was created with needle guidance. A bursectomy of the subacromial bursa was performed with the shaver and radiofrequency ablator in the standard fashion. This was then carried anteriorly with the arm in forward flexion and external rotation. We identified the pectoralis major tendon then cleared out soft tissue along the anteriolateral humerus proximal to it with a VAPR and defined the bicipital groove. The biceps tendon was debrided completely free from its sheath and surrounding soft tissue. All soft tissue was removed from the section of the bicipital groove we planned to use for the tenodesis. Using spinal needle guidance we made an accessory anterior portal inferior and anterior to the lateral working portal. A 5 cm PassPort cannula was placed here.  I tagged the biceps tendon with a loop suture and then reamed a 7.5 mm socket in the proximal humerus.  I secured the biceps to the proximal humerus using a  tenodesis screw.  We cut the remnant proximal excess biceps tendon and removed this from the shoulder.    We identified the nearly full thickness rotator cuff tear which involved the supraspinatus tendons. The tear was of crescent type pattern, and the quality of the tissue was good. We identified the undersurface of the acromion. We used a VAPR to debride the undersurface of the acromion up to the anterior and lateral margins. We identified the CA ligament and reflected it off the acromion. We continued debridement of the bursal tissue, identified the rotator cuff tear, and worked to define the edges more clearly. The remnants of rotator cuff tissue at the greater tuberosity was debrided and the greater tuberosity was debrided down to bleeding bone. We used the liberator VAPR to release adhesions both superiorly and inferiorly above and below the rotator cuff. After we had done this, it was able to mobilize the quite well. We began the repair once we had our tissue appropriately mobilized.  We passed suture tapes anterior and posterior through the torn tendon. I then split the suture tape loop into 2 seperate strands.     Next, we began debridement of the lateral humerus with a VAPR probe. We debrided down to bony base. At this time, we then brought out 1 suture from each medial tape into 1 lateral row anchors. These were brought down tightly and had excellent purchase of the lateral cortex. We were very satisfied with the repair.    Once we completed this, we took the remaining final arthroscopic pictures. We then removed our instruments, closed the portals with #3-0 monocryl suture. Sterile dressings were applied. The patient was then placed in an abduction pillow and sling, awoken from general anesthetic, and taken to recovery room in a stable condition.

## 2024-02-23 NOTE — DISCHARGE SUMMARY
[facility] - Periop Services  Discharge Note  Short Stay    Procedure(s) (LRB):  REPAIR, ROTATOR CUFF, ARTHROSCOPIC (Left)  ARTHROSCOPY,SHOULDER,WITH BICEPS TENODESIS (Left)      OUTCOME: Patient tolerated treatment/procedure well without complication and is now ready for discharge.    DISPOSITION: Home or Self Care    FINAL DIAGNOSIS:  <principal problem not specified>    FOLLOWUP: In clinic    DISCHARGE INSTRUCTIONS:  No discharge procedures on file.     TIME SPENT ON DISCHARGE: 10 minutes

## 2024-02-23 NOTE — ANESTHESIA PROCEDURE NOTES
Intubation    Date/Time: 2/23/2024 12:03 PM    Performed by: Fady Coley CRNA  Authorized by: Fady Coley CRNA    Intubation:     Induction:  Intravenous    Mask Ventilation:  Moderately difficult with oral airway    Attempts:  1    Attempted By:  CRNA    Method of Intubation:  Video laryngoscopy    Blade:  Glidescope 3    Laryngeal View Grade: Grade I - full view of cords      Difficult Airway Encountered?: Yes      Complications:  None    Airway Device:  Oral endotracheal tube    Airway Device Size:  7.5    Style/Cuff Inflation:  Cuffed    Inflation Amount (mL):  5    Tube secured:  21    Secured at:  The lips    Placement Verified By:  Capnometry    Complicating Factors:  Anterior larynx    Findings Post-Intubation:  BS equal bilateral

## 2024-02-23 NOTE — DISCHARGE INSTRUCTIONS
[facility]  Dilan Florez Jr., MD  [address], [address]   [place] [address]   [phone], fax [phone]       SHOULDER ARTHROSCOPY    DIET:  Following general anesthesia, start with clear liquids to decrease chances of nausea.  Begin with water, coffee, tea, ginger ale, sprite, or apple juice.  If tolerated, advance to Jell-o, soup, crackers, or toast.  Once these are tolerated, advance to a regular diet.    DRESSING:  Keep the dressing clean and dry.  Remove the dressing on the 2nd day after surgery and replace the gauze with bandaids.  If you have steri-strips or band-aids in place of stitches, allow them to stay in place as long as possible.  They usually fall off on their own within 7-10 days.  You may trim the edges as the steri-strips begin to curl.  Steri-strips can get wet in the shower-pat dry with a towel after showers.    SHOWERING:  You may shower 5 days after surgery.  Remove the dressing or band-aids before showering.  Leave the incisions open to air after showering.  You can cover the sutures with band-aids if clothing irritates the stitches.  You do not need to reapply a dressing, but you may do so if you continue to have drainage.  It is not uncommon to have drainage a few days after surgery.      ACTIVITY:       -  Sleep as upright as possible using extra pillows as needed.  A recliner may also be helpful to sleep upright.  Do this for the first few days after surgery to help decrease pain and swelling.       -  Ice should be applied to the shoulder for 30 minutes, 5-6 times per day, for the 1st week to help decrease pain and swelling.  After the first week, apply as needed, especially after exercises and physical therapy.       - Wear the sling at all times including while sleeping.  The only time you may remove the sling is for showers and to perform the following exercises.    EXERCISES:  Perform the following exercises 3 times per day:       1.   Fully bend and straighten your fingers, wrist and elbow 10 times.       2.  Lean forward, bracing yourself on a table/chair with normal arm.  Let your surgery arm swing down in from and to the side of you in a gentle circular motion.  Use your upper body to generate the movement for this, NOT the surgery arm.  Your arm should move in gentle circles like a pendulum or elephant trunk (picture below)                        PAIN MEDICATION:       -  Use the Percocet as prescribed for pain after surgery.  Pain medicine can cause nausea and vomiting, especially on an empty stomach.       -  In addition, you can take Ketorolac as prescribed or Iburpofen 200 mg, 4 pills every 8 hours.  Ketorolac or Iburpofen medicine can irritate your stomach or cause heartburn.  If this happens to you, stop taking the medicine.       -  Ice and elevation are more useful than pain medicine for surgical pain.  If you are having too much pain or discomfort, try more ice and higher elevation.       -  Do NOT drive a vehicle or use heavy machinery while taking pain medication       -  Do NOT drink alcohol while taking pain medication.    BLOOD CLOT PREVENTION:  If you are aware that you are at high risk for blood blots, notify your physician.  In general, you should walk as much as possible after surgery to increase blood circulation throughout your body. Most patients will take Aspirin 81 mg, 1 pill twice a day for 2 weeks to help further prevent blood clots.    DRIVING OR FLYING:  In general, you should NOT drive while wearing a sling  You must NEVER drive while taking narcotic pain medication  You may ride in a car, take a train, or fly once you feel comfortable    PHYSICAL THERAPY:  Take your physical therapy prescription to the physical therapy clinic of your choice.  Make an appointment 1-2 days after surgery.  All exercises and activities must be within the protocol until rehab is complete.  Some patients will not start physical therapy  until after their first follow up appointment.    WORK OR SCHOOL:  You may return to an office job or school whenever comfortable.  Most patients return ~ 1 week after surgery.  For more active jobs that require extended walking, squatting, or lifting, you can wait until after your follow up appointment.  Any other types of jobs should be discussed with Dr. Florez to determine a date for return to work.    PROBLEMS TO REPORT:       1.  Fever greater than 102 F       2.  Incision that is very red and/or draining pus       3.  Unable to urinate within 8 hours of surgery (a rare effect of being put to sleep for surgery)  Calls should be directed to the clinic:  [phone]    RETURN APPOINTMENT:  To confirm or reschedule your appointment, call [phone]

## 2024-02-23 NOTE — TRANSFER OF CARE
"Anesthesia Transfer of Care Note    Patient: Cristóbal Mckeon Jr.    Procedure(s) Performed: Procedure(s) (LRB):  REPAIR, ROTATOR CUFF, ARTHROSCOPIC (Left)  ARTHROSCOPY,SHOULDER,WITH BICEPS TENODESIS (Left)    Patient location: PACU    Anesthesia Type: general    Transport from OR: Transported from OR on room air with adequate spontaneous ventilation    Post pain: adequate analgesia    Post assessment: no apparent anesthetic complications    Post vital signs: stable    Level of consciousness: awake and sedated    Nausea/Vomiting: no nausea/vomiting    Complications: none    Transfer of care protocol was followed      Last vitals: Visit Vitals  /88   Pulse 82   Temp 36.2 °C (97.2 °F) (Tympanic)   Resp 16   Ht 5' 8" (1.727 m)   Wt 114.3 kg (251 lb 15.8 oz)   SpO2 96%   BMI 38.31 kg/m²     "

## 2024-02-23 NOTE — ANESTHESIA POSTPROCEDURE EVALUATION
Anesthesia Post Evaluation    Patient: Cristóbal Mckeon     Procedure(s) Performed: Procedure(s) (LRB):  REPAIR, ROTATOR CUFF, ARTHROSCOPIC (Left)  ARTHROSCOPY,SHOULDER,WITH BICEPS TENODESIS (Left)    Final Anesthesia Type: general      Patient location during evaluation: PACU  Patient participation: Yes- Able to Participate  Level of consciousness: awake and alert and oriented  Post-procedure vital signs: reviewed and stable  Pain management: adequate  Airway patency: patent  SRIDHAR mitigation strategies: Multimodal analgesia  PONV status at discharge: No PONV  Anesthetic complications: no      Cardiovascular status: blood pressure returned to baseline, hemodynamically stable and stable  Respiratory status: unassisted  Hydration status: euvolemic  Follow-up not needed.  Comments: PostOp pain well managed with Nerve Block (Suprclavic., Axillary, Femoral, etc.)              Vitals Value Taken Time   /84 02/23/24 1356   Temp 37.3 02/23/24 1400   Pulse 84 02/23/24 1359   Resp 14 02/23/24 1350   SpO2 96 % 02/23/24 1359   Vitals shown include unvalidated device data.      No case tracking events are documented in the log.      Pain/Laura Score: Laura Score: 10 (2/23/2024  1:50 PM)

## 2024-02-23 NOTE — ANESTHESIA PREPROCEDURE EVALUATION
02/23/2024  Cristóbal Mckeno Jr. is a 58 y.o., male who presents with Left Shoulder pain due to complete tear of his Left Rotator cuff.  Diagnosis:   Nontraumatic complete tear of left rotator cuff [M75.122]   Pre-op diagnosis: Nontraumatic complete tear of left rotator cuff [M75.122]       The pt. comes [facility] for the noted procedure under ([facility]A with [facility] for postOp pain).  Procedures:      REPAIR, ROTATOR CUFF, ARTHROSCOPIC (Left: Shoulder)      ARTHROSCOPY,SHOULDER,WITH BICEPS TENODESIS (Left: Shoulder)   Anesthesia type: Gen ETT       PMHx:  No specialty history recorded    Other Medical History   Digestive disorder Hypertension   Rotator cuff tear Seasonal allergies     Surgical History:  SHOULDER ARTHROSCOPY KNEE ARTHROSCOPY   TONSILLECTOMY DEBRIDEMENT TENNIS ELBOW   COLONOSCOPY          Vital signs:        Lab Data:      EKG:      Pre-op Assessment    I have reviewed the Patient Summary Reports.     I have reviewed the Nursing Notes. I have reviewed the NPO Status.   I have reviewed the Medications.     Review of Systems  Anesthesia Hx:  No problems with previous Anesthesia                Social:  Non-Smoker       Hematology/Oncology:  Hematology Normal   Oncology Normal                                   EENT/Dental:  EENT/Dental Normal           Cardiovascular:  Exercise tolerance: good   Hypertension                Functional Capacity good / => 4 METS                         Pulmonary:  Pulmonary Normal                       Renal/:  Renal/ Normal                 Hepatic/GI:  Hepatic/GI Normal                 Musculoskeletal:  Musculoskeletal Normal                Neurological:  Neurology Normal                                      Endocrine:  Endocrine Normal          Obesity / BMI > 30  Dermatological:  Skin Normal    Psych:  Psychiatric Normal                       Anesthesia  Plan  Type of Anesthesia, risks & benefits discussed:    Anesthesia Type: Gen ETT  Intra-op Monitoring Plan: Standard ASA Monitors  Post Op Pain Control Plan: multimodal analgesia, IV/PO Opioids PRN and peripheral nerve block  Induction:  IV and Inhalation  Airway Plan: Direct  Informed Consent: Informed consent signed with the Patient and all parties understand the risks and agree with anesthesia plan.  All questions answered. Patient consented to blood products? Yes  ASA Score: 2  Day of Surgery Review of History & Physical: H&P Update referred to the surgeon/provider.  Anesthesia Plan Notes: Block to be performed in preOp holding. See procedure note for Block in preOp holding.  IV induction for GETA.    Ready For Surgery From Anesthesia Perspective.     .

## 2024-03-06 ENCOUNTER — OFFICE VISIT (OUTPATIENT)
Dept: ORTHOPEDICS | Facility: CLINIC | Age: 59
End: 2024-03-06
Payer: COMMERCIAL

## 2024-03-06 VITALS
DIASTOLIC BLOOD PRESSURE: 80 MMHG | WEIGHT: 252 LBS | HEIGHT: 68 IN | BODY MASS INDEX: 38.19 KG/M2 | SYSTOLIC BLOOD PRESSURE: 125 MMHG | HEART RATE: 81 BPM

## 2024-03-06 DIAGNOSIS — Z98.890 S/P LEFT ROTATOR CUFF REPAIR: Primary | ICD-10-CM

## 2024-03-06 PROCEDURE — 1159F MED LIST DOCD IN RCRD: CPT | Mod: CPTII,,, | Performed by: NURSE PRACTITIONER

## 2024-03-06 PROCEDURE — 99024 POSTOP FOLLOW-UP VISIT: CPT | Mod: ,,, | Performed by: NURSE PRACTITIONER

## 2024-03-06 PROCEDURE — 3074F SYST BP LT 130 MM HG: CPT | Mod: CPTII,,, | Performed by: NURSE PRACTITIONER

## 2024-03-06 PROCEDURE — 4010F ACE/ARB THERAPY RXD/TAKEN: CPT | Mod: CPTII,,, | Performed by: NURSE PRACTITIONER

## 2024-03-06 PROCEDURE — 3079F DIAST BP 80-89 MM HG: CPT | Mod: CPTII,,, | Performed by: NURSE PRACTITIONER

## 2024-03-06 NOTE — PROGRESS NOTES
Chief Complaint:   Chief Complaint   Patient presents with    Left Shoulder - Post-op Evaluation    Post-op Evaluation     12 day s/p Lt shoulder RTC Repair, Bicep tenodesis here for eval. Doing good c/o minimal pain. Working with PT and wearing sling.  Sx 2/23/24  GL 4/8/24.     mn       History of present illness:  2/23/24: Left shoulder arthroscopic rotator cuff repair, biceps tenodesis     He returns today.  His pain is under good control.  He is working with therapy.  Compliant in his sling    Musculoskeletal:   Incisions healing, Steri strips intact. Without erythema, drainage, or signs of infection. Distally neurovascular intact.            Assessment: S/P left rotator cuff repair        Plan:  Doing well status post above.  Continue sling and formal therapy.  Follow up in 4 weeks for range-of-motion check

## 2024-03-21 ENCOUNTER — TELEPHONE (OUTPATIENT)
Dept: ORTHOPEDICS | Facility: CLINIC | Age: 59
End: 2024-03-21
Payer: COMMERCIAL

## 2024-03-21 NOTE — TELEPHONE ENCOUNTER
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

## 2024-04-08 ENCOUNTER — OFFICE VISIT (OUTPATIENT)
Dept: ORTHOPEDICS | Facility: CLINIC | Age: 59
End: 2024-04-08
Payer: COMMERCIAL

## 2024-04-08 VITALS
SYSTOLIC BLOOD PRESSURE: 134 MMHG | HEIGHT: 68 IN | HEART RATE: 93 BPM | BODY MASS INDEX: 39.38 KG/M2 | DIASTOLIC BLOOD PRESSURE: 80 MMHG | WEIGHT: 259.81 LBS

## 2024-04-08 DIAGNOSIS — Z98.890 S/P LEFT ROTATOR CUFF REPAIR: Primary | ICD-10-CM

## 2024-04-08 PROCEDURE — 1159F MED LIST DOCD IN RCRD: CPT | Mod: CPTII,,, | Performed by: NURSE PRACTITIONER

## 2024-04-08 PROCEDURE — 3079F DIAST BP 80-89 MM HG: CPT | Mod: CPTII,,, | Performed by: NURSE PRACTITIONER

## 2024-04-08 PROCEDURE — 4010F ACE/ARB THERAPY RXD/TAKEN: CPT | Mod: CPTII,,, | Performed by: NURSE PRACTITIONER

## 2024-04-08 PROCEDURE — 99024 POSTOP FOLLOW-UP VISIT: CPT | Mod: ,,, | Performed by: NURSE PRACTITIONER

## 2024-04-08 PROCEDURE — 3075F SYST BP GE 130 - 139MM HG: CPT | Mod: CPTII,,, | Performed by: NURSE PRACTITIONER

## 2024-04-08 NOTE — PROGRESS NOTES
Chief Complaint:   Chief Complaint   Patient presents with    Left Shoulder - Follow-up     6 weeks sp Lt shoulder RTC repair with biceps tenodesis sx on 02/23/24 gl 4/8/24, sleeping on recliner, PT going well 3 times a week- went this morning no complaints at this time       History of present illness:  2/23/24: Left shoulder arthroscopic rotator cuff repair, biceps tenodesis     He returns today.  He denies pain. Out of the sling. Working with therapy.     Musculoskeletal:   ROM full without pain.            Assessment: S/P left rotator cuff repair        Plan:  Doing well status post above.  Continue formal therapy. Follow up in 6 weeks for recheck.

## 2024-05-20 ENCOUNTER — OFFICE VISIT (OUTPATIENT)
Dept: ORTHOPEDICS | Facility: CLINIC | Age: 59
End: 2024-05-20
Payer: COMMERCIAL

## 2024-05-20 VITALS
WEIGHT: 259.69 LBS | DIASTOLIC BLOOD PRESSURE: 80 MMHG | SYSTOLIC BLOOD PRESSURE: 134 MMHG | HEART RATE: 92 BPM | BODY MASS INDEX: 39.36 KG/M2 | HEIGHT: 68 IN

## 2024-05-20 DIAGNOSIS — Z98.890 S/P LEFT ROTATOR CUFF REPAIR: Primary | ICD-10-CM

## 2024-05-20 PROCEDURE — 99213 OFFICE O/P EST LOW 20 MIN: CPT | Mod: ,,, | Performed by: NURSE PRACTITIONER

## 2024-05-20 PROCEDURE — 1159F MED LIST DOCD IN RCRD: CPT | Mod: CPTII,,, | Performed by: NURSE PRACTITIONER

## 2024-05-20 PROCEDURE — 3075F SYST BP GE 130 - 139MM HG: CPT | Mod: CPTII,,, | Performed by: NURSE PRACTITIONER

## 2024-05-20 PROCEDURE — 3079F DIAST BP 80-89 MM HG: CPT | Mod: CPTII,,, | Performed by: NURSE PRACTITIONER

## 2024-05-20 PROCEDURE — 4010F ACE/ARB THERAPY RXD/TAKEN: CPT | Mod: CPTII,,, | Performed by: NURSE PRACTITIONER

## 2024-05-20 PROCEDURE — 3008F BODY MASS INDEX DOCD: CPT | Mod: CPTII,,, | Performed by: NURSE PRACTITIONER

## 2024-05-20 NOTE — PROGRESS NOTES
Chief Complaint:   Chief Complaint   Patient presents with    Left Shoulder - Follow-up    Follow-up     3 month flu Left shoulder RTC repair, bicep tenodesis here for eval. Working with PT. Doing good c/o some soreness at times. Doing most activities just not lifting with out difficulty. Sx. 2/23/24.        Consulting Physician: No ref. provider found    History of present illness:  2/23/24: Left shoulder arthroscopic rotator cuff repair, biceps tenodesis      He returns today. Overall doing well. Working with therapy. Returning to normal activities.     Past Medical History:   Diagnosis Date    Digestive disorder     reflux    Hypertension     Rotator cuff tear     Seasonal allergies        Past Surgical History:   Procedure Laterality Date    ARTHROSCOPIC REPAIR OF ROTATOR CUFF OF SHOULDER Left 2/23/2024    Procedure: REPAIR, ROTATOR CUFF, ARTHROSCOPIC;  Surgeon: Dilan Florez Jr., MD;  Location: [facility];  Service: Orthopedics;  Laterality: Left;    ARTHROSCOPY,SHOULDER,WITH BICEPS TENODESIS Left 2/23/2024    Procedure: ARTHROSCOPY,SHOULDER,WITH BICEPS TENODESIS;  Surgeon: Dilan Florez Jr., MD;  Location: [facility] OR;  Service: Orthopedics;  Laterality: Left;    COLONOSCOPY      DEBRIDEMENT TENNIS ELBOW      KNEE ARTHROSCOPY Right     SHOULDER ARTHROSCOPY Right     TONSILLECTOMY  when I was a kid       Current Outpatient Medications   Medication Sig    lansoprazole (PREVACID) 30 MG capsule Take 30 mg by mouth once daily.    lisinopriL-hydrochlorothiazide (PRINZIDE,ZESTORETIC) 10-12.5 mg per tablet Take 1 tablet by mouth once daily.    montelukast (SINGULAIR) 10 mg tablet Take 10 mg by mouth once daily.    simvastatin (ZOCOR) 40 MG tablet Take 40 mg by mouth every evening.    tamsulosin (FLOMAX) 0.4 mg Cap Take 1 capsule by mouth once daily.    testosterone cypionate (DEPOTESTOTERONE CYPIONATE) 200 mg/mL injection Inject 200 mg into the muscle every 7 days.    traZODone (DESYREL) 100 MG tablet Take 150 mg by mouth  "every evening.    ketorolac (TORADOL) 10 mg tablet Take 1 tablet (10 mg total) by mouth 3 (three) times daily. (Patient not taking: Reported on 4/8/2024)    methocarbamoL (ROBAXIN) 750 MG Tab Take 1 tablet (750 mg total) by mouth 3 (three) times daily. (Patient not taking: Reported on 4/8/2024)    oxyCODONE-acetaminophen (PERCOCET) 5-325 mg per tablet Take 1 tablet by mouth every 6 (six) hours as needed for Pain. (Patient not taking: Reported on 4/8/2024)     No current facility-administered medications for this visit.       Review of patient's allergies indicates:   Allergen Reactions    Penicillins Hives and Other (See Comments)       Family History   Problem Relation Name Age of Onset    Stroke Mother Ale Mckeon     Cancer Father Cristóbal Mckeon     COPD Father Cristóbal Mckeon     Diabetes Father Cristóbal Mckeon        Social History     Socioeconomic History    Marital status:    Tobacco Use    Smoking status: Never    Smokeless tobacco: Never   Substance and Sexual Activity    Alcohol use: Yes     Alcohol/week: 12.0 standard drinks of alcohol     Types: 12 Cans of beer per week    Drug use: Never    Sexual activity: Yes     Partners: Female     Birth control/protection: None       Review of Systems:    Constitution:   Denies chills, fever, and sweats.  HENT:   Denies headaches or blurry vision.  Cardiovascular:  Denies chest pain or irregular heart beat.  Respiratory:   Denies cough or shortness of breath.  Gastrointestinal:  Denies abdominal pain, nausea, or vomiting.  Musculoskeletal:   Denies muscle cramps.  Neurological:   Denies dizziness or focal weakness.  Psychiatric/Behavior: Normal mental status.  Hematology/Lymph:  Denies bleeding problem or easy bruising/bleeding.  Skin:    Denies rash or suspicious lesions.    Examination:    Vital Signs:    Vitals:    05/20/24 1355 05/20/24 1356   BP: 134/80    Pulse: 92    Weight: 117.8 kg (259 lb 11.2 oz)    Height: 5' 7.99" (1.727 m)  "   PainSc:    3       Body mass index is 39.5 kg/m².    Constitution:   Well-developed, well nourished patient in no acute distress.  Neurological:   Alert and oriented x 3 and cooperative to examination.     Psychiatric/Behavior: Normal mental status.  Respiratory:   No shortness of breath.  Cards:    Pulses palpable and symmetric, brisk cap refill   Eyes:    Extraoccular muscles intact  Skin:    No scars, rash or suspicious lesions.    MSK:   Shoulder Exam:                   Right        Left  Skin:                                   Normal     Normal  AC joint tenderness:           None         None  Forward Flexion:                180            180  Abduction:                          180           180  External Rotation:               80              80  Internal Rotation:                80             80  Supraspinatus stress test: Neg           Neg  Hawkin's Impingement:     Neg           Neg  Neer Impingement:            Neg           Neg  Apprehension:                   Neg           Neg  [place]'s:                           Neg           Neg  Speed's test:                     Neg            Neg  Strength:  External Rotation:           5/5                5/5  Lift Off/belly press:          5/5                5/5    N-V status:                   Intact             Intact    C-spine: Normal ROM, NT       Assessment: S/P left rotator cuff repair        Plan:  Doing well s/p above. He can gradually resume his activities as tolerated. Follow up in 6 weeks for recheck and release to return to work full duty.

## 2024-07-08 ENCOUNTER — OFFICE VISIT (OUTPATIENT)
Dept: ORTHOPEDICS | Facility: CLINIC | Age: 59
End: 2024-07-08
Payer: COMMERCIAL

## 2024-07-08 VITALS
WEIGHT: 266.81 LBS | SYSTOLIC BLOOD PRESSURE: 135 MMHG | HEART RATE: 89 BPM | BODY MASS INDEX: 41.88 KG/M2 | HEIGHT: 67 IN | DIASTOLIC BLOOD PRESSURE: 84 MMHG

## 2024-07-08 DIAGNOSIS — Z98.890 S/P LEFT ROTATOR CUFF REPAIR: Primary | ICD-10-CM

## 2024-07-08 NOTE — LETTER
July 8, 2024       [facility]  [address], [address]  [facility] [address]  Phone: [phone]  Fax: [phone]       Patient: Cristóbal Mckeon   YOB: 1965  Date of Visit: 07/08/2024    To Whom It May Concern:    Nahid Mckeon  was at [facility] on 07/08/2024. The patient may return to work Full duty with no restrictions. If you have any questions or concerns, or if I can be of further assistance, please do not hesitate to contact me.    Sincerely,    Dilan Florez M.D.

## 2024-07-08 NOTE — PROGRESS NOTES
Chief Complaint:   Chief Complaint   Patient presents with    Follow-up     Patient here today for f/u left shoulder rtc repair with bicep tenodesis sx on 02/23/2024. He has completed PT. Denies pain or problems/concerns with ROM. He is working full time light duty right now and would like to go to full time regular duty if possible. He is  in [place].        Consulting Physician: No ref. provider found    History of present illness:  2/23/24: Left shoulder arthroscopic rotator cuff repair, biceps tenodesis      He returns today. Overall doing well. Completed therapy. Returning to normal activities.     Past Medical History:   Diagnosis Date    Digestive disorder     reflux    Hypertension     Rotator cuff tear     Seasonal allergies        Past Surgical History:   Procedure Laterality Date    ARTHROSCOPIC REPAIR OF ROTATOR CUFF OF SHOULDER Left 2/23/2024    Procedure: REPAIR, ROTATOR CUFF, ARTHROSCOPIC;  Surgeon: Dilan Florez Jr., MD;  Location: [facility] OR;  Service: Orthopedics;  Laterality: Left;    ARTHROSCOPY,SHOULDER,WITH BICEPS TENODESIS Left 2/23/2024    Procedure: ARTHROSCOPY,SHOULDER,WITH BICEPS TENODESIS;  Surgeon: Dilan Florez Jr., MD;  Location: [facility] OR;  Service: Orthopedics;  Laterality: Left;    COLONOSCOPY      DEBRIDEMENT TENNIS ELBOW      KNEE ARTHROSCOPY Right     SHOULDER ARTHROSCOPY Right     TONSILLECTOMY  when I was a kid       Current Outpatient Medications   Medication Sig    ketorolac (TORADOL) 10 mg tablet Take 1 tablet (10 mg total) by mouth 3 (three) times daily.    lansoprazole (PREVACID) 30 MG capsule Take 30 mg by mouth once daily.    lisinopriL-hydrochlorothiazide (PRINZIDE,ZESTORETIC) 10-12.5 mg per tablet Take 1 tablet by mouth once daily.    methocarbamoL (ROBAXIN) 750 MG Tab Take 1 tablet (750 mg total) by mouth 3 (three) times daily.    montelukast (SINGULAIR) 10 mg tablet Take 10 mg by mouth once daily.    oxyCODONE-acetaminophen (PERCOCET) 5-325 mg  "per tablet Take 1 tablet by mouth every 6 (six) hours as needed for Pain.    simvastatin (ZOCOR) 40 MG tablet Take 40 mg by mouth every evening.    tamsulosin (FLOMAX) 0.4 mg Cap Take 1 capsule by mouth once daily.    testosterone cypionate (DEPOTESTOTERONE CYPIONATE) 200 mg/mL injection Inject 200 mg into the muscle every 7 days.    traZODone (DESYREL) 100 MG tablet Take 150 mg by mouth every evening.     No current facility-administered medications for this visit.       Review of patient's allergies indicates:   Allergen Reactions    Penicillins Hives and Other (See Comments)       Family History   Problem Relation Name Age of Onset    Stroke Mother Ale Mckeon     Cancer Father Cristóbal Mckeon     COPD Father Cristóbal Mckeon     Diabetes Father Cristóbal Mckeon        Social History     Socioeconomic History    Marital status:    Tobacco Use    Smoking status: Never    Smokeless tobacco: Never   Substance and Sexual Activity    Alcohol use: Yes     Alcohol/week: 12.0 standard drinks of alcohol     Types: 12 Cans of beer per week    Drug use: Never    Sexual activity: Yes     Partners: Female     Birth control/protection: None       Review of Systems:    Constitution:   Denies chills, fever, and sweats.  HENT:   Denies headaches or blurry vision.  Cardiovascular:  Denies chest pain or irregular heart beat.  Respiratory:   Denies cough or shortness of breath.  Gastrointestinal:  Denies abdominal pain, nausea, or vomiting.  Musculoskeletal:   Denies muscle cramps.  Neurological:   Denies dizziness or focal weakness.  Psychiatric/Behavior: Normal mental status.  Hematology/Lymph:  Denies bleeding problem or easy bruising/bleeding.  Skin:    Denies rash or suspicious lesions.    Examination:    Vital Signs:    Vitals:    07/08/24 1312   BP: 135/84   Pulse: 89   Weight: 121 kg (266 lb 12.8 oz)   Height: 5' 7" (1.702 m)       Body mass index is 41.79 kg/m².    Constitution:   Well-developed, well " nourished patient in no acute distress.  Neurological:   Alert and oriented x 3 and cooperative to examination.     Psychiatric/Behavior: Normal mental status.  Respiratory:   No shortness of breath.  Cards:    Pulses palpable and symmetric, brisk cap refill   Eyes:    Extraoccular muscles intact  Skin:    No scars, rash or suspicious lesions.    MSK:   Shoulder Exam:                   Right        Left  Skin:                                   Normal     Normal  AC joint tenderness:           None         None  Forward Flexion:                180            180  Abduction:                          180           180  External Rotation:               80              80  Internal Rotation:                80             80  Supraspinatus stress test: Neg           Neg  Hawkin's Impingement:     Neg           Neg  Neer Impingement:            Neg           Neg  Apprehension:                   Neg           Neg  [place]'s:                           Neg           Neg  Speed's test:                     Neg            Neg  Strength:  External Rotation:           5/5                5/5  Lift Off/belly press:          5/5                5/5    N-V status:                   Intact             Intact    C-spine: Normal ROM, NT       Assessment: S/P left rotator cuff repair          Plan:  Doing well s/p above. He can gradually resume his activities as tolerated. Ok to return to work full duty. He can follow up as needed.

## (undated) DEVICE — KIT SURGICAL TURNOVER

## (undated) DEVICE — NDL SCORPION HD MEGALOADER

## (undated) DEVICE — TUBE SET INFLOW/OUTFLOW

## (undated) DEVICE — Device

## (undated) DEVICE — DRAPE STERI INSTRUMENT 1018

## (undated) DEVICE — PEROXIDE HYDROGEN 3% 16OZ

## (undated) DEVICE — GLOVE SENSICARE PI GRN 6.5

## (undated) DEVICE — RESTRAINT HEAD BCH CHR W/ CLIP

## (undated) DEVICE — CUSHION  WC FOAM 20X20X.75IN

## (undated) DEVICE — SUT FIBERWIRE TIGER 2M

## (undated) DEVICE — SUT FIBERWIRE

## (undated) DEVICE — TUBE SUCTION MEDI-VAC STERILE

## (undated) DEVICE — DRAPE INCISE IOBAN 2 23X23IN

## (undated) DEVICE — SOL NACL IRR 3000ML

## (undated) DEVICE — GLOVE SIGNATURE ESSNTL LTX 6

## (undated) DEVICE — TAPE ADH MEDIPORE 4 X 10YDS

## (undated) DEVICE — DRAPE SHOULDER BEACH CHAIR

## (undated) DEVICE — PAD ABD 8X10 STERILE

## (undated) DEVICE — APPLICATOR CHLORAPREP ORN 26ML

## (undated) DEVICE — BLADE SHAVER LANZA 4.2X13CM

## (undated) DEVICE — DRAPE U-DRAPE ADHESIVE 60X60IN

## (undated) DEVICE — ELECTRODE PATIENT RETURN DISP

## (undated) DEVICE — BLADE SURG CARBON STEEL SZ11

## (undated) DEVICE — CLOSURE SKIN STERI STRIP 1/2X4

## (undated) DEVICE — DRAPE STERI U-SHAPED 47X51IN

## (undated) DEVICE — GOWN POLY REINF X-LONG 2XL

## (undated) DEVICE — GLOVE SIGNATURE ESSNTL LTX 8

## (undated) DEVICE — KIT TRIMANO

## (undated) DEVICE — REAMER PILOTED HEADED 7.5MM

## (undated) DEVICE — SUT MONOCRYL 3-0 PS-2 UND

## (undated) DEVICE — PILLOW FACE ADLT FOAM W/VELCRO

## (undated) DEVICE — COVER MAYO STAND REINFRCD 30

## (undated) DEVICE — SLING SHOT II LARGE

## (undated) DEVICE — GAUZE SPONGE 4X4 12PLY

## (undated) DEVICE — NDL ANES SPINAL 18X3.5ST 18G

## (undated) DEVICE — CANNULA PASSPORT 8 MM X 5CM

## (undated) DEVICE — GLOVE SIGNATURE MICRO LTX 8